# Patient Record
Sex: FEMALE | Race: WHITE | NOT HISPANIC OR LATINO | URBAN - METROPOLITAN AREA
[De-identification: names, ages, dates, MRNs, and addresses within clinical notes are randomized per-mention and may not be internally consistent; named-entity substitution may affect disease eponyms.]

---

## 2017-03-03 ENCOUNTER — OUTPATIENT (OUTPATIENT)
Dept: OUTPATIENT SERVICES | Facility: HOSPITAL | Age: 72
LOS: 1 days | End: 2017-03-03
Payer: MEDICARE

## 2017-03-03 ENCOUNTER — APPOINTMENT (OUTPATIENT)
Dept: CT IMAGING | Facility: IMAGING CENTER | Age: 72
End: 2017-03-03

## 2017-03-03 DIAGNOSIS — Z00.8 ENCOUNTER FOR OTHER GENERAL EXAMINATION: ICD-10-CM

## 2017-03-03 PROCEDURE — 74178 CT ABD&PLV WO CNTR FLWD CNTR: CPT

## 2017-03-03 PROCEDURE — 82565 ASSAY OF CREATININE: CPT

## 2018-05-02 ENCOUNTER — APPOINTMENT (OUTPATIENT)
Dept: ORTHOPEDIC SURGERY | Facility: CLINIC | Age: 73
End: 2018-05-02

## 2018-06-19 ENCOUNTER — APPOINTMENT (OUTPATIENT)
Dept: ORTHOPEDIC SURGERY | Facility: CLINIC | Age: 73
End: 2018-06-19
Payer: MEDICARE

## 2018-06-19 VITALS
HEART RATE: 64 BPM | WEIGHT: 154 LBS | HEIGHT: 60 IN | SYSTOLIC BLOOD PRESSURE: 131 MMHG | DIASTOLIC BLOOD PRESSURE: 78 MMHG | BODY MASS INDEX: 30.23 KG/M2

## 2018-06-19 DIAGNOSIS — M17.11 UNILATERAL PRIMARY OSTEOARTHRITIS, RIGHT KNEE: ICD-10-CM

## 2018-06-19 DIAGNOSIS — M25.561 PAIN IN RIGHT KNEE: ICD-10-CM

## 2018-06-19 DIAGNOSIS — Z87.891 PERSONAL HISTORY OF NICOTINE DEPENDENCE: ICD-10-CM

## 2018-06-19 PROCEDURE — 72170 X-RAY EXAM OF PELVIS: CPT | Mod: 59

## 2018-06-19 PROCEDURE — 99204 OFFICE O/P NEW MOD 45 MIN: CPT

## 2018-06-19 PROCEDURE — 73562 X-RAY EXAM OF KNEE 3: CPT | Mod: RT

## 2018-06-19 RX ORDER — MULTIVIT-MIN/FA/LYCOPEN/LUTEIN .4-300-25
TABLET ORAL
Refills: 0 | Status: ACTIVE | COMMUNITY

## 2018-06-19 RX ORDER — CALCIUM CARB/VITAMIN D3/VIT K1 650MG-12.5
TABLET,CHEWABLE ORAL
Refills: 0 | Status: ACTIVE | COMMUNITY

## 2018-06-26 ENCOUNTER — FORM ENCOUNTER (OUTPATIENT)
Age: 73
End: 2018-06-26

## 2018-06-27 ENCOUNTER — OUTPATIENT (OUTPATIENT)
Dept: OUTPATIENT SERVICES | Facility: HOSPITAL | Age: 73
LOS: 1 days | End: 2018-06-27
Payer: MEDICARE

## 2018-06-27 ENCOUNTER — APPOINTMENT (OUTPATIENT)
Dept: MRI IMAGING | Facility: CLINIC | Age: 73
End: 2018-06-27
Payer: COMMERCIAL

## 2018-06-27 DIAGNOSIS — Z00.8 ENCOUNTER FOR OTHER GENERAL EXAMINATION: ICD-10-CM

## 2018-06-27 PROCEDURE — 73721 MRI JNT OF LWR EXTRE W/O DYE: CPT

## 2018-06-27 PROCEDURE — 73721 MRI JNT OF LWR EXTRE W/O DYE: CPT | Mod: 26,RT

## 2018-07-13 ENCOUNTER — OUTPATIENT (OUTPATIENT)
Dept: OUTPATIENT SERVICES | Facility: HOSPITAL | Age: 73
LOS: 1 days | End: 2018-07-13
Payer: MEDICARE

## 2018-07-13 VITALS
RESPIRATION RATE: 16 BRPM | TEMPERATURE: 98 F | HEIGHT: 60 IN | WEIGHT: 154.98 LBS | SYSTOLIC BLOOD PRESSURE: 150 MMHG | OXYGEN SATURATION: 97 % | HEART RATE: 62 BPM | DIASTOLIC BLOOD PRESSURE: 69 MMHG

## 2018-07-13 DIAGNOSIS — Z98.890 OTHER SPECIFIED POSTPROCEDURAL STATES: Chronic | ICD-10-CM

## 2018-07-13 DIAGNOSIS — M17.11 UNILATERAL PRIMARY OSTEOARTHRITIS, RIGHT KNEE: ICD-10-CM

## 2018-07-13 DIAGNOSIS — M25.561 PAIN IN RIGHT KNEE: ICD-10-CM

## 2018-07-13 DIAGNOSIS — Z01.818 ENCOUNTER FOR OTHER PREPROCEDURAL EXAMINATION: ICD-10-CM

## 2018-07-13 LAB
ALBUMIN SERPL ELPH-MCNC: 3.9 G/DL — SIGNIFICANT CHANGE UP (ref 3.3–5)
ALP SERPL-CCNC: 85 U/L — SIGNIFICANT CHANGE UP (ref 30–120)
ALT FLD-CCNC: 23 U/L DA — SIGNIFICANT CHANGE UP (ref 10–60)
ANION GAP SERPL CALC-SCNC: 5 MMOL/L — SIGNIFICANT CHANGE UP (ref 5–17)
APTT BLD: 28.3 SEC — SIGNIFICANT CHANGE UP (ref 27.5–37.4)
AST SERPL-CCNC: 20 U/L — SIGNIFICANT CHANGE UP (ref 10–40)
BILIRUB SERPL-MCNC: 0.3 MG/DL — SIGNIFICANT CHANGE UP (ref 0.2–1.2)
BLD GP AB SCN SERPL QL: SIGNIFICANT CHANGE UP
BUN SERPL-MCNC: 14 MG/DL — SIGNIFICANT CHANGE UP (ref 7–23)
CALCIUM SERPL-MCNC: 9.7 MG/DL — SIGNIFICANT CHANGE UP (ref 8.4–10.5)
CHLORIDE SERPL-SCNC: 99 MMOL/L — SIGNIFICANT CHANGE UP (ref 96–108)
CO2 SERPL-SCNC: 33 MMOL/L — HIGH (ref 22–31)
CREAT SERPL-MCNC: 0.82 MG/DL — SIGNIFICANT CHANGE UP (ref 0.5–1.3)
GLUCOSE SERPL-MCNC: 96 MG/DL — SIGNIFICANT CHANGE UP (ref 70–99)
HCT VFR BLD CALC: 40.7 % — SIGNIFICANT CHANGE UP (ref 34.5–45)
HGB BLD-MCNC: 13.5 G/DL — SIGNIFICANT CHANGE UP (ref 11.5–15.5)
INR BLD: 0.98 RATIO — SIGNIFICANT CHANGE UP (ref 0.88–1.16)
MCHC RBC-ENTMCNC: 29.7 PG — SIGNIFICANT CHANGE UP (ref 27–34)
MCHC RBC-ENTMCNC: 33.2 GM/DL — SIGNIFICANT CHANGE UP (ref 32–36)
MCV RBC AUTO: 89.6 FL — SIGNIFICANT CHANGE UP (ref 80–100)
MRSA PCR RESULT.: SIGNIFICANT CHANGE UP
NRBC # BLD: 0 /100 WBCS — SIGNIFICANT CHANGE UP (ref 0–0)
PLATELET # BLD AUTO: 183 K/UL — SIGNIFICANT CHANGE UP (ref 150–400)
POTASSIUM SERPL-MCNC: 4.6 MMOL/L — SIGNIFICANT CHANGE UP (ref 3.5–5.3)
POTASSIUM SERPL-SCNC: 4.6 MMOL/L — SIGNIFICANT CHANGE UP (ref 3.5–5.3)
PROT SERPL-MCNC: 8.3 G/DL — SIGNIFICANT CHANGE UP (ref 6–8.3)
PROTHROM AB SERPL-ACNC: 10.7 SEC — SIGNIFICANT CHANGE UP (ref 9.8–12.7)
RBC # BLD: 4.54 M/UL — SIGNIFICANT CHANGE UP (ref 3.8–5.2)
RBC # FLD: 13 % — SIGNIFICANT CHANGE UP (ref 10.3–14.5)
S AUREUS DNA NOSE QL NAA+PROBE: DETECTED
SODIUM SERPL-SCNC: 137 MMOL/L — SIGNIFICANT CHANGE UP (ref 135–145)
WBC # BLD: 5.95 K/UL — SIGNIFICANT CHANGE UP (ref 3.8–10.5)
WBC # FLD AUTO: 5.95 K/UL — SIGNIFICANT CHANGE UP (ref 3.8–10.5)

## 2018-07-13 PROCEDURE — 85730 THROMBOPLASTIN TIME PARTIAL: CPT

## 2018-07-13 PROCEDURE — 87641 MR-STAPH DNA AMP PROBE: CPT

## 2018-07-13 PROCEDURE — 93010 ELECTROCARDIOGRAM REPORT: CPT | Mod: NC

## 2018-07-13 PROCEDURE — G0463: CPT

## 2018-07-13 PROCEDURE — 93005 ELECTROCARDIOGRAM TRACING: CPT

## 2018-07-13 PROCEDURE — 87640 STAPH A DNA AMP PROBE: CPT

## 2018-07-13 PROCEDURE — 85027 COMPLETE CBC AUTOMATED: CPT

## 2018-07-13 PROCEDURE — 86850 RBC ANTIBODY SCREEN: CPT

## 2018-07-13 PROCEDURE — 86901 BLOOD TYPING SEROLOGIC RH(D): CPT

## 2018-07-13 PROCEDURE — 86900 BLOOD TYPING SEROLOGIC ABO: CPT

## 2018-07-13 PROCEDURE — 85610 PROTHROMBIN TIME: CPT

## 2018-07-13 PROCEDURE — 80053 COMPREHEN METABOLIC PANEL: CPT

## 2018-07-13 NOTE — H&P PST ADULT - PSH
History of   x2 ,   S/P lumpectomy of breast  right breast   Status post rotator cuff repair  right,

## 2018-07-13 NOTE — H&P PST ADULT - DOES PATIENT HAVE ADVANCE DIRECTIVE
Alert and oriented to person, place and time, memory intact, behavior appropriate to situation, PERRL. No

## 2018-07-13 NOTE — H&P PST ADULT - NEGATIVE ENMT SYMPTOMS
no hearing difficulty/no ear pain/no nasal obstruction/no post-nasal discharge/no abnormal taste sensation/no gum bleeding/no throat pain/no dysphagia/no nasal discharge/no nose bleeds/no recurrent cold sores/no sinus symptoms/no nasal congestion/no tinnitus/no dry mouth/no vertigo

## 2018-07-13 NOTE — H&P PST ADULT - MUSCULOSKELETAL
details… detailed exam no joint swelling/no joint erythema/decreased ROM due to pain/no calf tenderness/no joint warmth

## 2018-07-13 NOTE — H&P PST ADULT - PROBLEM SELECTOR PLAN 1
"Right total knee replacement"on 7/25/18  Pre op instructions were reviewed and signed  Patient will obtain medical clearance

## 2018-07-13 NOTE — H&P PST ADULT - HISTORY OF PRESENT ILLNESS
71 yo female is scheduled for "Right total knee replacement" on 7/25/18 with Cory Cruz MD.   Patient reports longstanding right knee pain for which has tried cortisone injections without relief.  Pain worst with weight bearing and rates 7/10.

## 2018-07-13 NOTE — H&P PST ADULT - NEGATIVE ALLERGY TYPES
no outdoor environmental allergies/no reactions to medicines/no reactions to food/no indoor environmental allergies

## 2018-07-13 NOTE — H&P PST ADULT - FAMILY HISTORY
Father  Still living? No  Family history of stroke (cerebrovascular), Age at diagnosis: Age Unknown     Sibling  Still living? No  Family history of brain tumor, Age at diagnosis: Age Unknown

## 2018-07-13 NOTE — H&P PST ADULT - NSANTHOSAYNRD_GEN_A_CORE
No. HA screening performed.  STOP BANG Legend: 0-2 = LOW Risk; 3-4 = INTERMEDIATE Risk; 5-8 = HIGH Risk

## 2018-07-16 RX ORDER — MUPIROCIN 20 MG/G
1 OINTMENT TOPICAL
Qty: 1 | Refills: 0 | OUTPATIENT
Start: 2018-07-16 | End: 2018-07-20

## 2018-07-16 NOTE — PROGRESS NOTE ADULT - SUBJECTIVE AND OBJECTIVE BOX
Call placed to inform patient about + MSSA nasal culture. Instructions for use of ointment reviewed with patient. She is reminded to document use on the yellow worksheet and to bring it in on day of surgery. All questions addressed and pt expresses understanding of instructions. She is coming for joint replacement class tomorrow. tg

## 2018-07-17 RX ORDER — CHLORHEXIDINE GLUCONATE 213 G/1000ML
1 SOLUTION TOPICAL ONCE
Qty: 0 | Refills: 0 | Status: COMPLETED | OUTPATIENT
Start: 2018-07-25 | End: 2018-07-25

## 2018-07-17 RX ORDER — APREPITANT 80 MG/1
40 CAPSULE ORAL ONCE
Qty: 0 | Refills: 0 | Status: COMPLETED | OUTPATIENT
Start: 2018-07-25 | End: 2018-07-25

## 2018-07-17 RX ORDER — SODIUM CHLORIDE 9 MG/ML
1000 INJECTION, SOLUTION INTRAVENOUS
Qty: 0 | Refills: 0 | Status: DISCONTINUED | OUTPATIENT
Start: 2018-07-25 | End: 2018-07-25

## 2018-07-24 RX ORDER — CELECOXIB 200 MG/1
200 CAPSULE ORAL ONCE
Qty: 0 | Refills: 0 | Status: COMPLETED | OUTPATIENT
Start: 2018-07-25 | End: 2018-07-25

## 2018-07-24 RX ORDER — SODIUM CHLORIDE 9 MG/ML
1000 INJECTION, SOLUTION INTRAVENOUS
Qty: 0 | Refills: 0 | Status: DISCONTINUED | OUTPATIENT
Start: 2018-07-25 | End: 2018-07-27

## 2018-07-24 RX ORDER — PANTOPRAZOLE SODIUM 20 MG/1
40 TABLET, DELAYED RELEASE ORAL DAILY
Qty: 0 | Refills: 0 | Status: DISCONTINUED | OUTPATIENT
Start: 2018-07-25 | End: 2018-07-27

## 2018-07-24 RX ORDER — MAGNESIUM HYDROXIDE 400 MG/1
30 TABLET, CHEWABLE ORAL DAILY
Qty: 0 | Refills: 0 | Status: DISCONTINUED | OUTPATIENT
Start: 2018-07-25 | End: 2018-07-27

## 2018-07-24 RX ORDER — DOCUSATE SODIUM 100 MG
100 CAPSULE ORAL THREE TIMES A DAY
Qty: 0 | Refills: 0 | Status: DISCONTINUED | OUTPATIENT
Start: 2018-07-25 | End: 2018-07-27

## 2018-07-24 RX ORDER — ONDANSETRON 8 MG/1
4 TABLET, FILM COATED ORAL EVERY 6 HOURS
Qty: 0 | Refills: 0 | Status: DISCONTINUED | OUTPATIENT
Start: 2018-07-25 | End: 2018-07-27

## 2018-07-24 RX ORDER — POLYETHYLENE GLYCOL 3350 17 G/17G
17 POWDER, FOR SOLUTION ORAL DAILY
Qty: 0 | Refills: 0 | Status: DISCONTINUED | OUTPATIENT
Start: 2018-07-25 | End: 2018-07-27

## 2018-07-24 RX ORDER — SENNA PLUS 8.6 MG/1
2 TABLET ORAL AT BEDTIME
Qty: 0 | Refills: 0 | Status: DISCONTINUED | OUTPATIENT
Start: 2018-07-25 | End: 2018-07-27

## 2018-07-25 ENCOUNTER — RESULT REVIEW (OUTPATIENT)
Age: 73
End: 2018-07-25

## 2018-07-25 ENCOUNTER — APPOINTMENT (OUTPATIENT)
Dept: ORTHOPEDIC SURGERY | Facility: HOSPITAL | Age: 73
End: 2018-07-25

## 2018-07-25 ENCOUNTER — INPATIENT (INPATIENT)
Facility: HOSPITAL | Age: 73
LOS: 1 days | Discharge: NURSING, REHAB OR LONG-TERM CARE | DRG: 470 | End: 2018-07-27
Attending: ORTHOPAEDIC SURGERY | Admitting: ORTHOPAEDIC SURGERY
Payer: MEDICARE

## 2018-07-25 VITALS
OXYGEN SATURATION: 100 % | RESPIRATION RATE: 16 BRPM | HEIGHT: 61 IN | HEART RATE: 64 BPM | WEIGHT: 154.98 LBS | DIASTOLIC BLOOD PRESSURE: 65 MMHG | TEMPERATURE: 98 F | SYSTOLIC BLOOD PRESSURE: 134 MMHG

## 2018-07-25 DIAGNOSIS — M17.11 UNILATERAL PRIMARY OSTEOARTHRITIS, RIGHT KNEE: ICD-10-CM

## 2018-07-25 DIAGNOSIS — Z98.890 OTHER SPECIFIED POSTPROCEDURAL STATES: Chronic | ICD-10-CM

## 2018-07-25 DIAGNOSIS — M25.561 PAIN IN RIGHT KNEE: ICD-10-CM

## 2018-07-25 DIAGNOSIS — Z01.818 ENCOUNTER FOR OTHER PREPROCEDURAL EXAMINATION: ICD-10-CM

## 2018-07-25 LAB
ABO RH CONFIRMATION: SIGNIFICANT CHANGE UP
ANION GAP SERPL CALC-SCNC: 2 MMOL/L — LOW (ref 5–17)
BUN SERPL-MCNC: 11 MG/DL — SIGNIFICANT CHANGE UP (ref 7–23)
CALCIUM SERPL-MCNC: 8.8 MG/DL — SIGNIFICANT CHANGE UP (ref 8.4–10.5)
CHLORIDE SERPL-SCNC: 103 MMOL/L — SIGNIFICANT CHANGE UP (ref 96–108)
CO2 SERPL-SCNC: 30 MMOL/L — SIGNIFICANT CHANGE UP (ref 22–31)
CREAT SERPL-MCNC: 0.93 MG/DL — SIGNIFICANT CHANGE UP (ref 0.5–1.3)
GLUCOSE SERPL-MCNC: 257 MG/DL — HIGH (ref 70–99)
HCT VFR BLD CALC: 37.4 % — SIGNIFICANT CHANGE UP (ref 34.5–45)
HGB BLD-MCNC: 12.4 G/DL — SIGNIFICANT CHANGE UP (ref 11.5–15.5)
MCHC RBC-ENTMCNC: 29.9 PG — SIGNIFICANT CHANGE UP (ref 27–34)
MCHC RBC-ENTMCNC: 33.2 GM/DL — SIGNIFICANT CHANGE UP (ref 32–36)
MCV RBC AUTO: 90.1 FL — SIGNIFICANT CHANGE UP (ref 80–100)
NRBC # BLD: 0 /100 WBCS — SIGNIFICANT CHANGE UP (ref 0–0)
PLATELET # BLD AUTO: 143 K/UL — LOW (ref 150–400)
POTASSIUM SERPL-MCNC: 4.2 MMOL/L — SIGNIFICANT CHANGE UP (ref 3.5–5.3)
POTASSIUM SERPL-SCNC: 4.2 MMOL/L — SIGNIFICANT CHANGE UP (ref 3.5–5.3)
RBC # BLD: 4.15 M/UL — SIGNIFICANT CHANGE UP (ref 3.8–5.2)
RBC # FLD: 13.2 % — SIGNIFICANT CHANGE UP (ref 10.3–14.5)
SODIUM SERPL-SCNC: 135 MMOL/L — SIGNIFICANT CHANGE UP (ref 135–145)
WBC # BLD: 8.63 K/UL — SIGNIFICANT CHANGE UP (ref 3.8–10.5)
WBC # FLD AUTO: 8.63 K/UL — SIGNIFICANT CHANGE UP (ref 3.8–10.5)

## 2018-07-25 PROCEDURE — 88305 TISSUE EXAM BY PATHOLOGIST: CPT | Mod: 26

## 2018-07-25 PROCEDURE — 27447 TOTAL KNEE ARTHROPLASTY: CPT | Mod: RT

## 2018-07-25 PROCEDURE — 88311 DECALCIFY TISSUE: CPT | Mod: 26

## 2018-07-25 PROCEDURE — 73562 X-RAY EXAM OF KNEE 3: CPT | Mod: 26,RT

## 2018-07-25 RX ORDER — HYDROMORPHONE HYDROCHLORIDE 2 MG/ML
0.5 INJECTION INTRAMUSCULAR; INTRAVENOUS; SUBCUTANEOUS
Qty: 0 | Refills: 0 | Status: DISCONTINUED | OUTPATIENT
Start: 2018-07-25 | End: 2018-07-27

## 2018-07-25 RX ORDER — APIXABAN 2.5 MG/1
2.5 TABLET, FILM COATED ORAL
Qty: 0 | Refills: 0 | Status: COMPLETED | OUTPATIENT
Start: 2018-07-26 | End: 2018-07-26

## 2018-07-25 RX ORDER — ONDANSETRON 8 MG/1
4 TABLET, FILM COATED ORAL ONCE
Qty: 0 | Refills: 0 | Status: DISCONTINUED | OUTPATIENT
Start: 2018-07-25 | End: 2018-07-25

## 2018-07-25 RX ORDER — ACETAMINOPHEN 500 MG
1000 TABLET ORAL ONCE
Qty: 0 | Refills: 0 | Status: COMPLETED | OUTPATIENT
Start: 2018-07-25 | End: 2018-07-25

## 2018-07-25 RX ORDER — HYDROMORPHONE HYDROCHLORIDE 2 MG/ML
0.5 INJECTION INTRAMUSCULAR; INTRAVENOUS; SUBCUTANEOUS
Qty: 0 | Refills: 0 | Status: DISCONTINUED | OUTPATIENT
Start: 2018-07-25 | End: 2018-07-25

## 2018-07-25 RX ORDER — OXYCODONE HYDROCHLORIDE 5 MG/1
5 TABLET ORAL
Qty: 0 | Refills: 0 | Status: DISCONTINUED | OUTPATIENT
Start: 2018-07-25 | End: 2018-07-27

## 2018-07-25 RX ORDER — CELECOXIB 200 MG/1
200 CAPSULE ORAL EVERY 12 HOURS
Qty: 0 | Refills: 0 | Status: DISCONTINUED | OUTPATIENT
Start: 2018-07-25 | End: 2018-07-27

## 2018-07-25 RX ORDER — TRANEXAMIC ACID 100 MG/ML
1000 INJECTION, SOLUTION INTRAVENOUS ONCE
Qty: 0 | Refills: 0 | Status: COMPLETED | OUTPATIENT
Start: 2018-07-25 | End: 2018-07-25

## 2018-07-25 RX ORDER — CEFAZOLIN SODIUM 1 G
2000 VIAL (EA) INJECTION EVERY 8 HOURS
Qty: 0 | Refills: 0 | Status: COMPLETED | OUTPATIENT
Start: 2018-07-25 | End: 2018-07-26

## 2018-07-25 RX ORDER — ACETAMINOPHEN 500 MG
1000 TABLET ORAL EVERY 8 HOURS
Qty: 0 | Refills: 0 | Status: DISCONTINUED | OUTPATIENT
Start: 2018-07-26 | End: 2018-07-27

## 2018-07-25 RX ORDER — OXYCODONE HYDROCHLORIDE 5 MG/1
10 TABLET ORAL
Qty: 0 | Refills: 0 | Status: DISCONTINUED | OUTPATIENT
Start: 2018-07-25 | End: 2018-07-27

## 2018-07-25 RX ORDER — APIXABAN 2.5 MG/1
2.5 TABLET, FILM COATED ORAL EVERY 12 HOURS
Qty: 0 | Refills: 0 | Status: DISCONTINUED | OUTPATIENT
Start: 2018-07-27 | End: 2018-07-27

## 2018-07-25 RX ORDER — CEFAZOLIN SODIUM 1 G
2000 VIAL (EA) INJECTION ONCE
Qty: 0 | Refills: 0 | Status: DISCONTINUED | OUTPATIENT
Start: 2018-07-25 | End: 2018-07-25

## 2018-07-25 RX ORDER — ACETAMINOPHEN 500 MG
1000 TABLET ORAL EVERY 6 HOURS
Qty: 0 | Refills: 0 | Status: COMPLETED | OUTPATIENT
Start: 2018-07-25 | End: 2018-07-26

## 2018-07-25 RX ORDER — SODIUM CHLORIDE 9 MG/ML
1000 INJECTION, SOLUTION INTRAVENOUS
Qty: 0 | Refills: 0 | Status: DISCONTINUED | OUTPATIENT
Start: 2018-07-25 | End: 2018-07-25

## 2018-07-25 RX ADMIN — CHLORHEXIDINE GLUCONATE 1 APPLICATION(S): 213 SOLUTION TOPICAL at 10:44

## 2018-07-25 RX ADMIN — SODIUM CHLORIDE 75 MILLILITER(S): 9 INJECTION, SOLUTION INTRAVENOUS at 16:15

## 2018-07-25 RX ADMIN — OXYCODONE HYDROCHLORIDE 5 MILLIGRAM(S): 5 TABLET ORAL at 23:40

## 2018-07-25 RX ADMIN — Medication 400 MILLIGRAM(S): at 19:52

## 2018-07-25 RX ADMIN — Medication 1000 MILLIGRAM(S): at 20:20

## 2018-07-25 RX ADMIN — CELECOXIB 200 MILLIGRAM(S): 200 CAPSULE ORAL at 10:43

## 2018-07-25 RX ADMIN — Medication 100 MILLIGRAM(S): at 20:20

## 2018-07-25 RX ADMIN — APREPITANT 40 MILLIGRAM(S): 80 CAPSULE ORAL at 10:44

## 2018-07-25 RX ADMIN — SODIUM CHLORIDE 75 MILLILITER(S): 9 INJECTION, SOLUTION INTRAVENOUS at 10:44

## 2018-07-25 NOTE — PROGRESS NOTE ADULT - SUBJECTIVE AND OBJECTIVE BOX
ORTHOPEDIC PA PROGRESS NOTE  ARNAUD NASH      72y Female                                                                                                                               POD #0        STATUS POST:               Pre-Op Dx: Primary osteoarthritis of right knee    Post-Op Dx:  Primary osteoarthritis of right knee    Procedure: Total knee arthroplasty: right total knee                                              Patient comfortable pain controlled. No compliants    Current Pain Management:  [ ] PCA   [x ] Po Analgesics [x ] IM /IV Anagesics     T(F): 97.6  HR: 62  BP: 119/55  RR: 17  SpO2: 96%                          Physical Exam :    -   Dressing clean dry intact  -   Distal Neurvascular status intact grossly.   -   Warm well perfused; capillary refill <3 seconds   -   Dorsi/plantar flexion 0/5 secondary to spinal anesthesia  -   (-) Calf tenderness Bilaterally    A/P: 72y Female s/p Primary osteoarthritis of right knee    -   Ortho Stable  -   Pain control   -   Medicine to follow  -   DVT ppx:     [x ]SCDs     [ ] ASA     [x ] Eliquis     [ ] Lovenox  -   Weight bearing status:  WBAT [x ]        PWB    [ ]     TTWB  [ ]      NWB  [ ]   -  Dispo:     Home [ ]     Acute Rehab [ ]     [facility] [ ]     TBD [x ]  -  No dorsi/plantar flexion prob from spinal will monitor

## 2018-07-25 NOTE — CONSULT NOTE ADULT - ASSESSMENT
72 female    1. primary severe osteoarthritis of joint s/p right tkr: opiates prn + bowel regimen.   Physical Therapy evaluation.  Discussed with orthopedic service Physician Assistant.     2. h/o breast cancer 2001 - no active issues.    3. post-op nausea and epigastric discomfort - zofran prn. PPI daily.    4. dvt prophylaxis per orthopedic protocol     5. Dispo: home in 2 days.  d/w RN plan of care.

## 2018-07-25 NOTE — CONSULT NOTE ADULT - SUBJECTIVE AND OBJECTIVE BOX
Patient is a 72y old  Female who presents with a chief complaint of "Dr Cruz is going to replace my RIGHT knee" (2018 15:58)        HPI: primary severe osteoarthritis of joint s/p right tkr.    feels well; no pain.    h/o breast cancer  - no active issues    post-op nausea and epigastric discomfort - medicated      PAST MEDICAL & SURGICAL HISTORY:  BMI 30.0-30.9,adult  History of breast cancer:   Osteoarthritis of right knee  Status post rotator cuff repair: right, 2012  History of : x2 ,   S/P lumpectomy of breast: right breast       REVIEW OF SYSTEMS:    negative unless otherwise specified in HPI.      MEDICATIONS  (STANDING):  acetaminophen  IVPB. 1000 milliGRAM(s) IV Intermittent every 6 hours  ceFAZolin   IVPB 2000 milliGRAM(s) IV Intermittent once  ceFAZolin   IVPB 2000 milliGRAM(s) IV Intermittent every 8 hours  celecoxib 200 milliGRAM(s) Oral every 12 hours  lactated ringers. 1000 milliLiter(s) (75 mL/Hr) IV Continuous <Continuous>    MEDICATIONS  (PRN):  HYDROmorphone  Injectable 0.5 milliGRAM(s) IV Push every 3 hours PRN Severe Pain (7 - 10)  HYDROmorphone  Injectable 0.5 milliGRAM(s) IV Push every 10 minutes PRN Severe Pain (7 - 10)  ondansetron Injectable 4 milliGRAM(s) IV Push once PRN Nausea and/or Vomiting  oxyCODONE    IR 5 milliGRAM(s) Oral every 3 hours PRN Mild Pain (1 - 3)  oxyCODONE    IR 10 milliGRAM(s) Oral every 3 hours PRN Moderate Pain (4 - 6)      Allergies    No Known Allergies    Intolerances        SOCIAL HISTORY: ex-smoker    FAMILY HISTORY:  Family history of brain tumor (Sibling)  Family history of stroke (cerebrovascular) (Father)      Vital Signs Last 24 Hrs  T(C): 36.4 (2018 14:27), Max: 36.8 (2018 12:32)  T(F): 97.6 (2018 14:27), Max: 98.3 (2018 12:32)  HR: 54 (2018 14:52) (54 - 70)  BP: 103/47 (2018 14:52) (95/70 - 134/65)  BP(mean): --  RR: 17 (2018 14:52) (15 - 18)  SpO2: 100% (2018 14:52) (100% - 100%)    PHYSICAL EXAM:  GENERAL: No apparent distress  HEAD:  Atraumatic, Normocephalic  EYES: conjunctiva and sclera clear  ENMT: Moist mucous membranes  NECK: Supple  CHEST/LUNG: Clear to auscultation; no rales/wheeze or rubs  HEART: Regular rate and rhythm, no murmurs, rubs or gallops  ABDOMEN: Soft, Nontender, Nondistended; Bowel sounds present  EXTREMITIES:  No clubbing, cyanosis or edema  SKIN: No rashes or lesions  NERVOUS SYSTEM:  Alert & Oriented X3; Bilateral lower extremity mobile, sensation to light touch intact  INCISION:  Dressing dry and intact    LABS:                                              IMAGING: imaging reviewed personally    Consultant Notes Reviewed and Care Discussed with relevant Consultants/Other Providers.

## 2018-07-25 NOTE — BRIEF OPERATIVE NOTE - PROCEDURE
<<-----Click on this checkbox to enter Procedure Total knee arthroplasty  07/25/2018  right total knee  Active  MATTEO

## 2018-07-26 ENCOUNTER — TRANSCRIPTION ENCOUNTER (OUTPATIENT)
Age: 73
End: 2018-07-26

## 2018-07-26 LAB
ANION GAP SERPL CALC-SCNC: 5 MMOL/L — SIGNIFICANT CHANGE UP (ref 5–17)
BUN SERPL-MCNC: 11 MG/DL — SIGNIFICANT CHANGE UP (ref 7–23)
CALCIUM SERPL-MCNC: 9.1 MG/DL — SIGNIFICANT CHANGE UP (ref 8.4–10.5)
CHLORIDE SERPL-SCNC: 105 MMOL/L — SIGNIFICANT CHANGE UP (ref 96–108)
CO2 SERPL-SCNC: 29 MMOL/L — SIGNIFICANT CHANGE UP (ref 22–31)
CREAT SERPL-MCNC: 0.78 MG/DL — SIGNIFICANT CHANGE UP (ref 0.5–1.3)
GLUCOSE SERPL-MCNC: 135 MG/DL — HIGH (ref 70–99)
HCT VFR BLD CALC: 34.1 % — LOW (ref 34.5–45)
HGB BLD-MCNC: 11.6 G/DL — SIGNIFICANT CHANGE UP (ref 11.5–15.5)
MCHC RBC-ENTMCNC: 30.4 PG — SIGNIFICANT CHANGE UP (ref 27–34)
MCHC RBC-ENTMCNC: 34 GM/DL — SIGNIFICANT CHANGE UP (ref 32–36)
MCV RBC AUTO: 89.5 FL — SIGNIFICANT CHANGE UP (ref 80–100)
NRBC # BLD: 0 /100 WBCS — SIGNIFICANT CHANGE UP (ref 0–0)
PLATELET # BLD AUTO: 158 K/UL — SIGNIFICANT CHANGE UP (ref 150–400)
POTASSIUM SERPL-MCNC: 4.8 MMOL/L — SIGNIFICANT CHANGE UP (ref 3.5–5.3)
POTASSIUM SERPL-SCNC: 4.8 MMOL/L — SIGNIFICANT CHANGE UP (ref 3.5–5.3)
RBC # BLD: 3.81 M/UL — SIGNIFICANT CHANGE UP (ref 3.8–5.2)
RBC # FLD: 13.1 % — SIGNIFICANT CHANGE UP (ref 10.3–14.5)
SODIUM SERPL-SCNC: 139 MMOL/L — SIGNIFICANT CHANGE UP (ref 135–145)
WBC # BLD: 12.43 K/UL — HIGH (ref 3.8–10.5)
WBC # FLD AUTO: 12.43 K/UL — HIGH (ref 3.8–10.5)

## 2018-07-26 PROCEDURE — 99233 SBSQ HOSP IP/OBS HIGH 50: CPT

## 2018-07-26 RX ORDER — APIXABAN 2.5 MG/1
1 TABLET, FILM COATED ORAL
Qty: 20 | Refills: 0
Start: 2018-07-26 | End: 2018-08-04

## 2018-07-26 RX ORDER — PANTOPRAZOLE SODIUM 20 MG/1
1 TABLET, DELAYED RELEASE ORAL
Qty: 30 | Refills: 1
Start: 2018-07-26 | End: 2018-09-23

## 2018-07-26 RX ORDER — CELECOXIB 200 MG/1
1 CAPSULE ORAL
Qty: 38 | Refills: 0 | OUTPATIENT
Start: 2018-07-26 | End: 2018-08-13

## 2018-07-26 RX ADMIN — CELECOXIB 200 MILLIGRAM(S): 200 CAPSULE ORAL at 17:44

## 2018-07-26 RX ADMIN — Medication 100 MILLIGRAM(S): at 21:33

## 2018-07-26 RX ADMIN — Medication 400 MILLIGRAM(S): at 00:56

## 2018-07-26 RX ADMIN — CELECOXIB 200 MILLIGRAM(S): 200 CAPSULE ORAL at 06:01

## 2018-07-26 RX ADMIN — PANTOPRAZOLE SODIUM 40 MILLIGRAM(S): 20 TABLET, DELAYED RELEASE ORAL at 12:29

## 2018-07-26 RX ADMIN — Medication 1000 MILLIGRAM(S): at 21:33

## 2018-07-26 RX ADMIN — APIXABAN 2.5 MILLIGRAM(S): 2.5 TABLET, FILM COATED ORAL at 12:28

## 2018-07-26 RX ADMIN — Medication 1000 MILLIGRAM(S): at 01:10

## 2018-07-26 RX ADMIN — APIXABAN 2.5 MILLIGRAM(S): 2.5 TABLET, FILM COATED ORAL at 22:45

## 2018-07-26 RX ADMIN — Medication 1000 MILLIGRAM(S): at 07:04

## 2018-07-26 RX ADMIN — Medication 100 MILLIGRAM(S): at 12:30

## 2018-07-26 RX ADMIN — Medication 1000 MILLIGRAM(S): at 22:27

## 2018-07-26 RX ADMIN — Medication 400 MILLIGRAM(S): at 06:42

## 2018-07-26 RX ADMIN — CELECOXIB 200 MILLIGRAM(S): 200 CAPSULE ORAL at 18:14

## 2018-07-26 RX ADMIN — Medication 1000 MILLIGRAM(S): at 12:29

## 2018-07-26 RX ADMIN — Medication 100 MILLIGRAM(S): at 04:16

## 2018-07-26 RX ADMIN — CELECOXIB 200 MILLIGRAM(S): 200 CAPSULE ORAL at 06:19

## 2018-07-26 RX ADMIN — OXYCODONE HYDROCHLORIDE 5 MILLIGRAM(S): 5 TABLET ORAL at 00:15

## 2018-07-26 RX ADMIN — Medication 1000 MILLIGRAM(S): at 12:59

## 2018-07-26 RX ADMIN — SENNA PLUS 2 TABLET(S): 8.6 TABLET ORAL at 21:33

## 2018-07-26 NOTE — DISCHARGE NOTE ADULT - HOSPITAL COURSE
This patient was admitted to [facility] with a history of severe degenerative joint disease of the right knee.  Patient went to Pre-Surgical Testing at [facility] and was medically cleared to undergo elective procedure. Right TKR performed on 7/25/18 by . No operative or landry-operative complications arose during patients hospital course.  Patient received antibiotic according to SCIP guidelines for infection prevention.  Eliquis was given for DVT prophylaxis.  Anesthesia, Medical Hospitalist, Physical Therapy and Occupational Therapy were consulted. Patient is stable for discharge with a good prognosis.  Appropriate discharge instructions and medications are provided in this document. This patient was admitted to [facility] with a history of severe degenerative joint disease of the right knee.  Patient went to Pre-Surgical Testing at [facility] and was medically cleared to undergo elective procedure. Right TKR performed on 7/25/18 by . No operative or landry-operative complications arose during patients hospital course.  Patient received antibiotic according to SCIP guidelines for infection prevention.  Eliquis was given for DVT prophylaxis.  Anesthesia, Medical Hospitalist, Physical Therapy and Occupational Therapy were consulted. Patient is stable for discharge with a good prognosis.  Appropriate discharge instructions and medications are provided in this document.     Pt seen and examined. Pt noted to have platelets drop to 138 while on Eliquis. Pt to have repeat cbc on monday 07/30/18.  [phone] Dr Pedro Porter to follow results. Pt verbalized understanding.

## 2018-07-26 NOTE — DISCHARGE NOTE ADULT - CARE PLAN
Principal Discharge DX:	Osteoarthritis of right knee, unspecified osteoarthritis type  Goal:	Improve ambulation, ADLs and quality of life  Assessment and plan of treatment:	Physical Therapy/Occupational Therapy for ambulation, transfers, stairs, ADL's, Range of Motion Exercises, Isometrics.  Full weight bearing as tolerated with rolling walker  Range of Motion Goals: Flexion 120 degrees; Extension 0 degrees  Keep incision clean and dry.  Suture/prineo dressing removal 14 days after surgery at rehab facility or Surgeon's office  May shower post-op day #5 if no drainage from incision Principal Discharge DX:	Osteoarthritis of right knee, unspecified osteoarthritis type  Goal:	Improve ambulation, ADLs and quality of life  Assessment and plan of treatment:	Physical Therapy/Occupational Therapy for ambulation, transfers, stairs, ADL's, Range of Motion Exercises, Isometrics.  Full weight bearing as tolerated with rolling walker  Range of Motion Goals: Flexion 120 degrees; Extension 0 degrees  Keep incision clean and dry.  Suture/prineo dressing removal 14 days after surgery at rehab facility or Surgeon's office  May shower post-op day #5 if no drainage from incision  Goal:	DVT prophylaxis  Assessment and plan of treatment:	once Eliquis complete take Ecotrin 162mg twice a day x 4 weeks  Assessment and plan of treatment:	Pt noted to have platelets drop to 138 while on Eliquis. Pt to have repeat cbc on monday 07/30/18.  [phone] Dr Pedro Porter to follow results. Pt verbalized understanding.

## 2018-07-26 NOTE — DISCHARGE NOTE ADULT - INSTRUCTIONS
For Constipation :   • Increase your water intake. Drink at least 8 glasses of water daily.  • Try adding fiber to your diet by eating fruits, vegetables and foods that are rich in grains.  • If you do experience constipation, you may take an over-the-counter stool softener/laxative such as Neyda Colace, Senekot or  Milk of Magnesia. follow up with Dr. Cruz

## 2018-07-26 NOTE — PROGRESS NOTE ADULT - SUBJECTIVE AND OBJECTIVE BOX
chief complaint of "Dr Cruz is going to replace my RIGHT knee" (2018 15:58)    HPI:  71 yo female with pmh of breast cancer sp right lumpectpomy, OA sp elective right  knee replacement on 18.       REVIEW OF SYSTEMS  General: no lethargy	  Skin/Breast: no rash  Ophthalmologic: no blurry vision  ENMT:	no sore throat, no ear pain  Respiratory and Thorax:	no sob, no cough, wheezing  Cardiovascular:	no chest pain, no palpitations, no lower extremity swelling  Gastrointestinal:	no nausea, no vomiting,   Genitourinary: no dysuria, no frequency	  Musculoskeletal:	 no muscle pain  Neurological: no weakness  Psychiatric: no anxiety	  Hematology/Lymphatics:	 no bruising  Endocrine: no increased thirst, no change in appetite  PAST MEDICAL & SURGICAL HISTORY:  BMI 30.0-30.9,adult  History of breast cancer:   Osteoarthritis of right knee  Status post rotator cuff repair: right, 2012  History of : x2 1972,   S/P lumpectomy of breast: right breast     MEDICATIONS  (STANDING):  acetaminophen   Tablet. 1000 milliGRAM(s) Oral every 8 hours  apixaban 2.5 milliGRAM(s) Oral <User Schedule>  celecoxib 200 milliGRAM(s) Oral every 12 hours  docusate sodium 100 milliGRAM(s) Oral three times a day  lactated ringers. 1000 milliLiter(s) (100 mL/Hr) IV Continuous <Continuous>  pantoprazole    Tablet 40 milliGRAM(s) Oral daily  senna 2 Tablet(s) Oral at bedtime    MEDICATIONS  (PRN):  aluminum hydroxide/magnesium hydroxide/simethicone Suspension 30 milliLiter(s) Oral four times a day PRN Indigestion  HYDROmorphone  Injectable 0.5 milliGRAM(s) IV Push every 3 hours PRN Severe Pain (7 - 10)  magnesium hydroxide Suspension 30 milliLiter(s) Oral daily PRN Constipation  ondansetron Injectable 4 milliGRAM(s) IV Push every 6 hours PRN Nausea and/or Vomiting  oxyCODONE    IR 5 milliGRAM(s) Oral every 3 hours PRN Mild Pain (1 - 3)  oxyCODONE    IR 10 milliGRAM(s) Oral every 3 hours PRN Moderate Pain (4 - 6)  polyethylene glycol 3350 17 Gram(s) Oral daily PRN Constipation      EXAM:  Vital Signs Last 24 Hrs  T(C): 36.7 (2018 07:11), Max: 36.8 (2018 12:32)  T(F): 98 (2018 07:11), Max: 98.3 (2018 12:32)  HR: 61 (2018 07:11) (50 - 70)  BP: 104/60 (2018 07:11) (95/70 - 134/65)  BP(mean): --  RR: 15 (2018 07:11) (13 - 18)  SpO2: 95% (2018 07:11) (95% - 100%)     @ 07:01  -   @ 07:00  --------------------------------------------------------  IN: 4060 mL / OUT: 1250 mL / NET: 2810 mL        PHYSICAL EXAM:  Constitutional: awake sleeping in stretcher chair.   Eyes: eomi, perrla.  ENMT: patent nares, moist mucus membranes  Neck: supple  Breasts: deferred  Back: non tender. no scoliosis.   Respiratory: bilaterally clear to auscultation, no wheezing, no rhonchi, no crackles, no decreased air entry  Cardiovascular: s1s2, rrr, no murmurs.   Gastrointestinal: soft, non tender, +bowel sounds, no rebound, no guarding.   Genitourinary: deferred  Rectal: deferred   Extremities: no edema.   Vascular:   Neurological: alert and oriented to person, date and place.   Skin: intact no rash, warm to touch.   Lymph Nodes:  Musculoskeletal: moves all 4 extremities  Psychiatric: no homicidal, suicidal ideation. appropriate affect.     LABS:                              11.6   12.43 )-----------( 158      ( 2018 07:20 )             34.1         139  |  105  |  11  ----------------------------<  135<H>  4.8   |  29  |  0.78    Ca    9.1      2018 07:20 chief complaint of "Dr Cruz is going to replace my RIGHT knee" (2018 15:58)    HPI:  73 yo female with pmh of breast cancer sp right lumpectpomy, OA sp elective right  knee replacement on 18.     Today:  Pt notes being able to tolerate PT. Pt voiding freely, no bowel movement yet but is passing gas.     REVIEW OF SYSTEMS  General: no lethargy	  Skin/Breast: no rash  Ophthalmologic: no blurry vision  ENMT:	no sore throat, no ear pain  Respiratory and Thorax:	no sob, no cough, wheezing  Cardiovascular:	no chest pain, no palpitations, +ve lower extremity swelling  Gastrointestinal:	no nausea, no vomiting,   Genitourinary: no dysuria, no frequency	  Musculoskeletal:	 no muscle pain  Neurological: no weakness  Psychiatric: no anxiety	  Hematology/Lymphatics:	 no bruising  Endocrine: no increased thirst, no change in appetite    PAST MEDICAL & SURGICAL HISTORY:  BMI 30.0-30.9,adult  History of breast cancer:   Osteoarthritis of right knee  Status post rotator cuff repair: right, 2012  History of : x2 ,   S/P lumpectomy of breast: right breast     MEDICATIONS  (STANDING):  acetaminophen   Tablet. 1000 milliGRAM(s) Oral every 8 hours  apixaban 2.5 milliGRAM(s) Oral <User Schedule>  celecoxib 200 milliGRAM(s) Oral every 12 hours  docusate sodium 100 milliGRAM(s) Oral three times a day  lactated ringers. 1000 milliLiter(s) (100 mL/Hr) IV Continuous <Continuous>  pantoprazole    Tablet 40 milliGRAM(s) Oral daily  senna 2 Tablet(s) Oral at bedtime    MEDICATIONS  (PRN):  aluminum hydroxide/magnesium hydroxide/simethicone Suspension 30 milliLiter(s) Oral four times a day PRN Indigestion  HYDROmorphone  Injectable 0.5 milliGRAM(s) IV Push every 3 hours PRN Severe Pain (7 - 10)  magnesium hydroxide Suspension 30 milliLiter(s) Oral daily PRN Constipation  ondansetron Injectable 4 milliGRAM(s) IV Push every 6 hours PRN Nausea and/or Vomiting  oxyCODONE    IR 5 milliGRAM(s) Oral every 3 hours PRN Mild Pain (1 - 3)  oxyCODONE    IR 10 milliGRAM(s) Oral every 3 hours PRN Moderate Pain (4 - 6)  polyethylene glycol 3350 17 Gram(s) Oral daily PRN Constipation      EXAM:  Vital Signs Last 24 Hrs  T(C): 36.7 (2018 07:11), Max: 36.8 (2018 12:32)  T(F): 98 (2018 07:11), Max: 98.3 (2018 12:32)  HR: 61 (2018 07:11) (50 - 70)  BP: 104/60 (2018 07:11) (95/70 - 134/65)  BP(mean): --  RR: 15 (2018 07:11) (13 - 18)  SpO2: 95% (2018 07:11) (95% - 100%)     @ 07:01  -   @ 07:00  --------------------------------------------------------  IN: 4060 mL / OUT: 1250 mL / NET: 2810 mL    PHYSICAL EXAM:  Constitutional: awake in bed.  at bedside.   ENMT: patent nares, moist mucus membranes  Neck: supple  Respiratory: bilaterally clear to auscultation, no wheezing, no rhonchi, no crackles, no decreased air entry  Cardiovascular: s1s2, rrr, no murmurs.   Gastrointestinal: soft, non tender, +bowel sounds, no rebound, no guarding.   Extremities: +ve edema to RLE with ACE dressing.   Neurological: alert and oriented to person, date and place.   Skin: no rash, warm to touch.   Musculoskeletal: moves all 4 extremities  Psychiatric: appropriate affect.     LABS:                              11.6   12.43 )-----------( 158      ( 2018 07:20 )             34.1     -    139  |  105  |  11  ----------------------------<  135<H>  4.8   |  29  |  0.78    Ca    9.1      2018 07:20

## 2018-07-26 NOTE — DISCHARGE NOTE ADULT - PATIENT PORTAL LINK FT
You can access the [facility], offered by [facility], by registering with the following website: [email or web address]

## 2018-07-26 NOTE — DISCHARGE NOTE ADULT - CARE PROVIDERS DIRECT ADDRESSES
[email or web address]

## 2018-07-26 NOTE — OCCUPATIONAL THERAPY INITIAL EVALUATION ADULT - LEVEL OF INDEPENDENCE: SIT/STAND, REHAB EVAL
Patient has neurogenic bladder with suprapubic catheter who presented in sepsis secondary to UTI.    - S/P replacement of suprapubic catheter by urology, to f/u in 1 month.   - Urine cultures growing VRE   - Continue Ampicillin D6, (D9/10 total antibiotics) minimum assist (75% patients effort)

## 2018-07-26 NOTE — PROGRESS NOTE ADULT - ASSESSMENT
72 female admitted for elective right  knee replacement of O.A:    -Primary severe osteoarthritis of joint s/p right tkr:   Pain control with opiates prn  Continue with bowel regimen.   Physical Therapy and OT.      - h/o breast cancer 2001:  no active issues.    - post-op nausea and epigastric discomfort:  zofran prn. PPI daily.    -Elevated fasting glucose x1:  check Hga1c and tomorrow's fasting glucose in am.     -DVT ppx:  dvt prophylaxis per orthopedic protoco 72 female admitted for elective right  knee replacement of O.A:    -Primary severe osteoarthritis of joint s/p right tkr:   Pain control with opiates prn  Continue with bowel regimen.   Physical Therapy and OT.      - h/o breast cancer 2001:  no active issues.    - post-op nausea and epigastric discomfort:  resolved.   zofran prn. PPI daily.    -Elevated fasting glucose x1: possibly secondary to stress induced from surgery.   check Hga1c and tomorrow's fasting glucose in am.     -DVT ppx:  dvt prophylaxis per orthopedic protocol

## 2018-07-26 NOTE — PROGRESS NOTE ADULT - SUBJECTIVE AND OBJECTIVE BOX
Orthopedic P.A.- POD# 1 - s/p Right TKR    Patient alert and comfortable in bed with oral meds for pain control.  Denies knee pain or nausea.    Vital Signs Last 24 Hrs  T(C): 36.8 (26 Jul 2018 11:12), Max: 36.8 (26 Jul 2018 11:12)  T(F): 98.2 (26 Jul 2018 11:12), Max: 98.2 (26 Jul 2018 11:12)  HR: 52 (26 Jul 2018 11:12) (50 - 70)  BP: 102/56 (26 Jul 2018 11:12) (95/70 - 126/64)  BP(mean): --  RR: 16 (26 Jul 2018 11:12) (13 - 18)  SpO2: 98% (26 Jul 2018 11:12) (95% - 100%)         I&O's Detail    25 Jul 2018 07:01  -  26 Jul 2018 07:00  --------------------------------------------------------  IN:    IV PiggyBack: 300 mL    lactated ringers.: 1800 mL    lactated ringers.: 1100 mL    Oral Fluid: 860 mL  Total IN: 4060 mL    OUT:    Estimated Blood Loss: 100 mL    Voided: 1150 mL  Total OUT: 1250 mL    Total NET: 2810 mL        Labs:                                           11.6   12.43<H> )-----------( 158      ( 26 Jul 2018 07:20 )             34.1<L>  26 Jul 2018 07:20                                26 Jul 2018 07:20    139    |  105    |  11     ----------------------------<  135<H>  4.8     |  29     |  0.78     Ca    9.1        26 Jul 2018 07:20        MEDICATIONS:acetaminophen   Tablet. 1000 milliGRAM(s) Oral every 8 hours  aluminum hydroxide/magnesium hydroxide/simethicone Suspension 30 milliLiter(s) Oral four times a day PRN  apixaban 2.5 milliGRAM(s) Oral <User Schedule>  celecoxib 200 milliGRAM(s) Oral every 12 hours  docusate sodium 100 milliGRAM(s) Oral three times a day  HYDROmorphone  Injectable 0.5 milliGRAM(s) IV Push every 3 hours PRN  lactated ringers. 1000 milliLiter(s) IV Continuous <Continuous>  magnesium hydroxide Suspension 30 milliLiter(s) Oral daily PRN  ondansetron Injectable 4 milliGRAM(s) IV Push every 6 hours PRN  oxyCODONE    IR 5 milliGRAM(s) Oral every 3 hours PRN  oxyCODONE    IR 10 milliGRAM(s) Oral every 3 hours PRN  pantoprazole    Tablet 40 milliGRAM(s) Oral daily  polyethylene glycol 3350 17 Gram(s) Oral daily PRN  senna 2 Tablet(s) Oral at bedtime    Anticoagulation:  apixaban 2.5 milliGRAM(s) Oral every 123 hours started this AM      Antibiotics: complete      Pain medications:   acetaminophen   Tablet. 1000 milliGRAM(s) Oral every 8 hours  celecoxib 200 milliGRAM(s) Oral every 12 hours  HYDROmorphone  Injectable 0.5 milliGRAM(s) IV Push every 3 hours PRN  ondansetron Injectable 4 milliGRAM(s) IV Push every 6 hours PRN  oxyCODONE    IR 5 milliGRAM(s) Oral every 3 hours PRN  oxyCODONE    IR 10 milliGRAM(s) Oral every 3 hours PRN                                      Physical Exam:  Right knee- Primary surgical bandage dry and intact.   Neurovascular grossly intact LE's.  PAS on LE's.  Calves soft and non-tender.                                                                                                                                                          A/P:  Orthopedically stable.  -Continue pain management with above plan  -DVT prophylaxis with 12 days of Eliquis (high Caprini score) and 4 additional weeks of Ecotrin.  -Check labs again tomorrow; Labs OK today  -Increase ambulation and ROM right knee with PT/OT  -Dr. Mckenna for medical care toay  -Discharge planning for Home with home care services tomorrow.  -Further plan as per attendings.

## 2018-07-26 NOTE — DISCHARGE NOTE ADULT - PLAN OF CARE
Improve ambulation, ADLs and quality of life Physical Therapy/Occupational Therapy for ambulation, transfers, stairs, ADL's, Range of Motion Exercises, Isometrics.  Full weight bearing as tolerated with rolling walker  Range of Motion Goals: Flexion 120 degrees; Extension 0 degrees  Keep incision clean and dry.  Suture/prineo dressing removal 14 days after surgery at rehab facility or Surgeon's office  May shower post-op day #5 if no drainage from incision DVT prophylaxis once Eliquis complete take Ecotrin 162mg twice a day x 4 weeks Pt noted to have platelets drop to 138 while on Eliquis. Pt to have repeat cbc on monday 07/30/18.  [phone] Dr Pedro Porter to follow results. Pt verbalized understanding.

## 2018-07-26 NOTE — DISCHARGE NOTE ADULT - MEDICATION SUMMARY - MEDICATIONS TO TAKE
I will START or STAY ON the medications listed below when I get home from the hospital:    rolling walker  -- Dx: Right TKR  -- Indication: For equipment    CBC with platelet count  -- Blood draw on Monday 7/30/18 to check CBC with platelet count    Call results to Dr. Pedro Amos     #[phone]  -- Indication: For anemia    oxyCODONE 5 mg oral tablet  -- 1 tab(s) by mouth every 3 hours, As needed, Pain (1 - 3) MDD:8  Reference #: [id number]   -- Indication: For Pain    meloxicam 15 mg oral tablet  -- 1 tab(s) by mouth once a day   -- Do not take this drug if you are pregnant.  Obtain medical advice before taking any non-prescription drugs as some may affect the action of this medication.  Take with food or milk.    -- Indication: For Pain    acetaminophen 500 mg oral tablet  -- 2 tab(s) by mouth every 8 hours for 3 weeks for pain mgmt  -- Indication: For Pain    apixaban 2.5 mg oral tablet  -- 1 tab(s) by mouth 2 times a day   -- Indication: For DVT prophylaxis, start Ecotrin 162mg twice a day x 4 weeks once comleted    docusate sodium 100 mg oral capsule  -- 1 cap(s) by mouth 3 times a day  -- Indication: For constipation    senna oral tablet  -- 2 tab(s) by mouth once a day (at bedtime)  -- Indication: For constipation    pantoprazole 40 mg oral delayed release tablet  -- 1 tab(s) by mouth once a day  -- Indication: For acid reflux

## 2018-07-26 NOTE — DISCHARGE NOTE ADULT - DURABLE MEDICAL EQUIPMENT AGENCY
[facility] [phone]: Lakia Blake, [employer] [facility] [phone]: Rolling Walker

## 2018-07-26 NOTE — DISCHARGE NOTE ADULT - NS AS ACTIVITY OBS
No Heavy lifting/straining/Do not make important decisions/Do not drive or operate machinery/Stairs allowed/Showering allowed

## 2018-07-27 VITALS
RESPIRATION RATE: 16 BRPM | SYSTOLIC BLOOD PRESSURE: 132 MMHG | HEART RATE: 61 BPM | TEMPERATURE: 98 F | DIASTOLIC BLOOD PRESSURE: 65 MMHG | OXYGEN SATURATION: 98 %

## 2018-07-27 LAB
ANION GAP SERPL CALC-SCNC: 6 MMOL/L — SIGNIFICANT CHANGE UP (ref 5–17)
BUN SERPL-MCNC: 17 MG/DL — SIGNIFICANT CHANGE UP (ref 7–23)
CALCIUM SERPL-MCNC: 8.9 MG/DL — SIGNIFICANT CHANGE UP (ref 8.4–10.5)
CHLORIDE SERPL-SCNC: 106 MMOL/L — SIGNIFICANT CHANGE UP (ref 96–108)
CO2 SERPL-SCNC: 27 MMOL/L — SIGNIFICANT CHANGE UP (ref 22–31)
CREAT SERPL-MCNC: 0.82 MG/DL — SIGNIFICANT CHANGE UP (ref 0.5–1.3)
GLUCOSE SERPL-MCNC: 97 MG/DL — SIGNIFICANT CHANGE UP (ref 70–99)
HBA1C BLD-MCNC: 5.5 % — SIGNIFICANT CHANGE UP (ref 4–5.6)
HCT VFR BLD CALC: 31.7 % — LOW (ref 34.5–45)
HGB BLD-MCNC: 10.6 G/DL — LOW (ref 11.5–15.5)
MCHC RBC-ENTMCNC: 30.4 PG — SIGNIFICANT CHANGE UP (ref 27–34)
MCHC RBC-ENTMCNC: 33.4 GM/DL — SIGNIFICANT CHANGE UP (ref 32–36)
MCV RBC AUTO: 90.8 FL — SIGNIFICANT CHANGE UP (ref 80–100)
NRBC # BLD: 0 /100 WBCS — SIGNIFICANT CHANGE UP (ref 0–0)
PLATELET # BLD AUTO: 135 K/UL — LOW (ref 150–400)
POTASSIUM SERPL-MCNC: 4 MMOL/L — SIGNIFICANT CHANGE UP (ref 3.5–5.3)
POTASSIUM SERPL-SCNC: 4 MMOL/L — SIGNIFICANT CHANGE UP (ref 3.5–5.3)
RBC # BLD: 3.49 M/UL — LOW (ref 3.8–5.2)
RBC # FLD: 13.6 % — SIGNIFICANT CHANGE UP (ref 10.3–14.5)
SODIUM SERPL-SCNC: 139 MMOL/L — SIGNIFICANT CHANGE UP (ref 135–145)
WBC # BLD: 7.65 K/UL — SIGNIFICANT CHANGE UP (ref 3.8–10.5)
WBC # FLD AUTO: 7.65 K/UL — SIGNIFICANT CHANGE UP (ref 3.8–10.5)

## 2018-07-27 PROCEDURE — C1713: CPT

## 2018-07-27 PROCEDURE — C1776: CPT

## 2018-07-27 PROCEDURE — 97530 THERAPEUTIC ACTIVITIES: CPT

## 2018-07-27 PROCEDURE — 99238 HOSP IP/OBS DSCHRG MGMT 30/<: CPT

## 2018-07-27 PROCEDURE — 83036 HEMOGLOBIN GLYCOSYLATED A1C: CPT

## 2018-07-27 PROCEDURE — 80048 BASIC METABOLIC PNL TOTAL CA: CPT

## 2018-07-27 PROCEDURE — 88305 TISSUE EXAM BY PATHOLOGIST: CPT

## 2018-07-27 PROCEDURE — 73562 X-RAY EXAM OF KNEE 3: CPT

## 2018-07-27 PROCEDURE — 36415 COLL VENOUS BLD VENIPUNCTURE: CPT

## 2018-07-27 PROCEDURE — 88311 DECALCIFY TISSUE: CPT

## 2018-07-27 PROCEDURE — C1889: CPT

## 2018-07-27 PROCEDURE — 97535 SELF CARE MNGMENT TRAINING: CPT

## 2018-07-27 PROCEDURE — 97165 OT EVAL LOW COMPLEX 30 MIN: CPT

## 2018-07-27 PROCEDURE — 97161 PT EVAL LOW COMPLEX 20 MIN: CPT

## 2018-07-27 PROCEDURE — 97110 THERAPEUTIC EXERCISES: CPT

## 2018-07-27 PROCEDURE — 85027 COMPLETE CBC AUTOMATED: CPT

## 2018-07-27 PROCEDURE — 97116 GAIT TRAINING THERAPY: CPT

## 2018-07-27 RX ORDER — DOCUSATE SODIUM 100 MG
1 CAPSULE ORAL
Qty: 0 | Refills: 0 | DISCHARGE
Start: 2018-07-27

## 2018-07-27 RX ORDER — ACETAMINOPHEN 500 MG
2 TABLET ORAL
Qty: 0 | Refills: 0 | DISCHARGE
Start: 2018-07-27

## 2018-07-27 RX ORDER — MELOXICAM 15 MG/1
1 TABLET ORAL
Qty: 21 | Refills: 0
Start: 2018-07-27 | End: 2018-08-16

## 2018-07-27 RX ORDER — OXYCODONE HYDROCHLORIDE 5 MG/1
1 TABLET ORAL
Qty: 56 | Refills: 0
Start: 2018-07-27 | End: 2018-08-02

## 2018-07-27 RX ORDER — SENNA PLUS 8.6 MG/1
2 TABLET ORAL
Qty: 0 | Refills: 0 | DISCHARGE
Start: 2018-07-27

## 2018-07-27 RX ADMIN — Medication 1000 MILLIGRAM(S): at 13:42

## 2018-07-27 RX ADMIN — PANTOPRAZOLE SODIUM 40 MILLIGRAM(S): 20 TABLET, DELAYED RELEASE ORAL at 13:42

## 2018-07-27 RX ADMIN — CELECOXIB 200 MILLIGRAM(S): 200 CAPSULE ORAL at 06:21

## 2018-07-27 RX ADMIN — CELECOXIB 200 MILLIGRAM(S): 200 CAPSULE ORAL at 05:33

## 2018-07-27 RX ADMIN — Medication 1000 MILLIGRAM(S): at 14:12

## 2018-07-27 RX ADMIN — APIXABAN 2.5 MILLIGRAM(S): 2.5 TABLET, FILM COATED ORAL at 09:41

## 2018-07-27 RX ADMIN — Medication 1000 MILLIGRAM(S): at 05:33

## 2018-07-27 RX ADMIN — Medication 1000 MILLIGRAM(S): at 06:21

## 2018-07-27 NOTE — PROGRESS NOTE ADULT - ASSESSMENT
72 female admitted for elective right  knee replacement of O.A:    -Primary severe osteoarthritis of joint s/p right tkr:   Pain control with opiates prn  Continue with bowel regimen.   Physical Therapy and OT.      - h/o breast cancer 2001:  no active issues.    - post-op nausea and epigastric discomfort:  resolved.   zofran prn. PPI daily.    -Elevated fasting glucose x1:   possibly secondary to stress induced from surgery.   improved.     -Thrombocytopenia:   pt noted to have decrease in platelets. Pt to have cbc on Monday with follow up results with Dr Porter.      -DVT ppx:  dvt prophylaxis per orthopedic protocol    -Dispo:  Dc home today.

## 2018-07-27 NOTE — PROGRESS NOTE ADULT - SUBJECTIVE AND OBJECTIVE BOX
Orthopedic P.A.- POD# 2 - s/p Right TKR    Patient alert and comfortable in bed with oral meds for pain control.  Denies knee pain or nausea. +BM post op    Vital Signs Last 24 Hrs  T(C): 36.8 (27 Jul 2018 07:18), Max: 36.9 (26 Jul 2018 19:00)  T(F): 98.2 (27 Jul 2018 07:18), Max: 98.5 (26 Jul 2018 19:00)  HR: 61 (27 Jul 2018 07:18) (52 - 61)  BP: 132/65 (27 Jul 2018 07:18) (102/56 - 137/69)  BP(mean): --  RR: 16 (27 Jul 2018 07:18) (15 - 16)  SpO2: 98% (27 Jul 2018 07:18) (97% - 98%)         I&O's Detail             Labs:                                                    10.6<L>  7.65  )-----------( 135<L>    ( 27 Jul 2018 07:31 )             31.7<L>  27 Jul 2018 07:31                                27 Jul 2018 07:31    139    |  106    |  17     ----------------------------<  97     4.0     |  27     |  0.82     Ca    8.9        27 Jul 2018 07:31        MEDICATIONS:acetaminophen   Tablet. 1000 milliGRAM(s) Oral every 8 hours  aluminum hydroxide/magnesium hydroxide/simethicone Suspension 30 milliLiter(s) Oral four times a day PRN  apixaban 2.5 milliGRAM(s) Oral every 12 hours  bisacodyl Suppository 10 milliGRAM(s) Rectal daily PRN  celecoxib 200 milliGRAM(s) Oral every 12 hours  docusate sodium 100 milliGRAM(s) Oral three times a day  HYDROmorphone  Injectable 0.5 milliGRAM(s) IV Push every 3 hours PRN  lactated ringers. 1000 milliLiter(s) IV Continuous <Continuous>  magnesium hydroxide Suspension 30 milliLiter(s) Oral daily PRN  ondansetron Injectable 4 milliGRAM(s) IV Push every 6 hours PRN  oxyCODONE    IR 5 milliGRAM(s) Oral every 3 hours PRN  oxyCODONE    IR 10 milliGRAM(s) Oral every 3 hours PRN  pantoprazole    Tablet 40 milliGRAM(s) Oral daily  polyethylene glycol 3350 17 Gram(s) Oral daily PRN  senna 2 Tablet(s) Oral at bedtime    Anticoagulation:  apixaban 2.5 milliGRAM(s) Oral every 12 hours      Pain medications:   acetaminophen   Tablet. 1000 milliGRAM(s) Oral every 8 hours  celecoxib 200 milliGRAM(s) Oral every 12 hours  HYDROmorphone  Injectable 0.5 milliGRAM(s) IV Push every 3 hours PRN  ondansetron Injectable 4 milliGRAM(s) IV Push every 6 hours PRN  oxyCODONE    IR 5 milliGRAM(s) Oral every 3 hours PRN  oxyCODONE    IR 10 milliGRAM(s) Oral every 3 hours PRN                                      Physical Exam:  Right knee- Primary surgical bandage removed and sterile bandage placed over dry and intact sutured surgical wound. Neurovascular grossly intact LE's.  PAS on LE's.  Calves soft and non-tender.                                                                                                                                                          A/P:  Orthopedically stable.  -Continue pain management with above plan.  -DVT prophylaxis with 12 days of Eliquis then 4 more weeks of Ecotrin.  -Monitor decreasing platelets.; repeat CBC in 3 days at home and f/u with surgeon (Eliquis may cause thrombocytopenia)  -PT/OT for clearance for safe ambulation and stairs.  -Dr. Mckenna for medical clearance for discharge home today.  -further plan as per attendings.

## 2018-07-27 NOTE — PROGRESS NOTE ADULT - SUBJECTIVE AND OBJECTIVE BOX
chief complaint of "Dr Cruz is going to replace my RIGHT knee" (2018 15:58)    HPI:  73 yo female with pmh of breast cancer sp right lumpectomy, OA sp elective right  knee replacement on 18.     Today:  Pt eager to go home.     REVIEW OF SYSTEMS:  General: no lethargy	  Skin/Breast: no rash  Ophthalmologic: no blurry vision  ENMT:	no sore throat, no ear pain  Respiratory and Thorax:	no sob, no cough, wheezing  Cardiovascular:	no chest pain, no palpitations, no lower extremity swelling  Gastrointestinal:	no nausea, no vomiting,   Genitourinary: no dysuria, no frequency	  Musculoskeletal:	 no muscle pain  Neurological: no weakness  Psychiatric: no anxiety	  Hematology/Lymphatics:	 no bruising  Endocrine: no increased thirst, no change in appetite    PAST MEDICAL & SURGICAL HISTORY:  BMI 30.0-30.9,adult  History of breast cancer:   Osteoarthritis of right knee  Status post rotator cuff repair: right, 2012  History of : x2 ,   S/P lumpectomy of breast: right breast     MEDICATIONS  (STANDING):  acetaminophen   Tablet. 1000 milliGRAM(s) Oral every 8 hours  apixaban 2.5 milliGRAM(s) Oral every 12 hours  celecoxib 200 milliGRAM(s) Oral every 12 hours  docusate sodium 100 milliGRAM(s) Oral three times a day  lactated ringers. 1000 milliLiter(s) (100 mL/Hr) IV Continuous <Continuous>  pantoprazole    Tablet 40 milliGRAM(s) Oral daily  senna 2 Tablet(s) Oral at bedtime    MEDICATIONS  (PRN):  aluminum hydroxide/magnesium hydroxide/simethicone Suspension 30 milliLiter(s) Oral four times a day PRN Indigestion  bisacodyl Suppository 10 milliGRAM(s) Rectal daily PRN If no bowel movement by postoperative day #2  HYDROmorphone  Injectable 0.5 milliGRAM(s) IV Push every 3 hours PRN Severe Pain (7 - 10)  magnesium hydroxide Suspension 30 milliLiter(s) Oral daily PRN Constipation  ondansetron Injectable 4 milliGRAM(s) IV Push every 6 hours PRN Nausea and/or Vomiting  oxyCODONE    IR 5 milliGRAM(s) Oral every 3 hours PRN Mild Pain (1 - 3)  oxyCODONE    IR 10 milliGRAM(s) Oral every 3 hours PRN Moderate Pain (4 - 6)  polyethylene glycol 3350 17 Gram(s) Oral daily PRN Constipation    EXAM:  Vital Signs Last 24 Hrs  T(C): 36.8 (2018 07:18), Max: 36.9 (2018 19:00)  T(F): 98.2 (2018 07:18), Max: 98.5 (2018 19:00)  HR: 61 (2018 07:18) (52 - 61)  BP: 132/65 (2018 07:18) (112/51 - 137/69)  BP(mean): --  RR: 16 (2018 07:18) (15 - 16)  SpO2: 98% (2018 07:18) (97% - 98%)    PHYSICAL EXAM:  Constitutional: awake in bed.   ENMT: patent nares, moist mucus membranes  Neck: supple  Respiratory: bilaterally clear to auscultation, no wheezing, no rhonchi, no crackles, no decreased air entry  Cardiovascular: s1s2, rrr, no murmurs.   Gastrointestinal: soft, non tender, +bowel sounds, no rebound, no guarding.   Extremities: +ve edema to RLE with ACE dressing.   Neurological: alert and oriented to person, date and place.   Skin: no rash, warm to touch.   Musculoskeletal: moves all 4 extremities  Psychiatric: appropriate affect.       LABS:                      10.6   7.65  )-----------( 135      ( 2018 07:31 )             31.7   07-  139  |  106  |  17  ----------------------------<  97  4.0   |  27  |  0.82  Ca    8.9      2018 07:31

## 2018-08-13 ENCOUNTER — APPOINTMENT (OUTPATIENT)
Dept: ORTHOPEDIC SURGERY | Facility: CLINIC | Age: 73
End: 2018-08-13
Payer: MEDICARE

## 2018-08-13 VITALS
HEIGHT: 60 IN | SYSTOLIC BLOOD PRESSURE: 113 MMHG | WEIGHT: 154 LBS | BODY MASS INDEX: 30.23 KG/M2 | HEART RATE: 73 BPM | DIASTOLIC BLOOD PRESSURE: 69 MMHG

## 2018-08-13 PROCEDURE — 99024 POSTOP FOLLOW-UP VISIT: CPT

## 2018-08-13 PROCEDURE — 73562 X-RAY EXAM OF KNEE 3: CPT | Mod: RT

## 2018-09-25 ENCOUNTER — APPOINTMENT (OUTPATIENT)
Dept: ORTHOPEDIC SURGERY | Facility: CLINIC | Age: 73
End: 2018-09-25
Payer: COMMERCIAL

## 2018-09-25 DIAGNOSIS — Z96.651 PRESENCE OF RIGHT ARTIFICIAL KNEE JOINT: ICD-10-CM

## 2018-09-25 PROCEDURE — 99024 POSTOP FOLLOW-UP VISIT: CPT

## 2018-09-25 PROCEDURE — 73562 X-RAY EXAM OF KNEE 3: CPT | Mod: RT

## 2019-11-12 ENCOUNTER — APPOINTMENT (OUTPATIENT)
Dept: ORTHOPEDIC SURGERY | Facility: CLINIC | Age: 74
End: 2019-11-12

## 2019-11-20 ENCOUNTER — RESULT REVIEW (OUTPATIENT)
Age: 74
End: 2019-11-20

## 2021-06-16 ENCOUNTER — EMERGENCY (EMERGENCY)
Facility: HOSPITAL | Age: 76
LOS: 1 days | Discharge: HOME | End: 2021-06-16
Attending: EMERGENCY MEDICINE
Payer: COMMERCIAL

## 2021-06-16 VITALS
WEIGHT: 154.98 LBS | TEMPERATURE: 98 F | DIASTOLIC BLOOD PRESSURE: 81 MMHG | HEART RATE: 86 BPM | OXYGEN SATURATION: 96 % | SYSTOLIC BLOOD PRESSURE: 149 MMHG | RESPIRATION RATE: 17 BRPM | HEIGHT: 61 IN

## 2021-06-16 VITALS
SYSTOLIC BLOOD PRESSURE: 155 MMHG | RESPIRATION RATE: 17 BRPM | HEART RATE: 84 BPM | TEMPERATURE: 98 F | OXYGEN SATURATION: 100 % | DIASTOLIC BLOOD PRESSURE: 87 MMHG

## 2021-06-16 DIAGNOSIS — Z98.890 OTHER SPECIFIED POSTPROCEDURAL STATES: Chronic | ICD-10-CM

## 2021-06-16 PROCEDURE — 90471 IMMUNIZATION ADMIN: CPT

## 2021-06-16 PROCEDURE — 12001 RPR S/N/AX/GEN/TRNK 2.5CM/<: CPT

## 2021-06-16 PROCEDURE — 90715 TDAP VACCINE 7 YRS/> IM: CPT

## 2021-06-16 PROCEDURE — 99283 EMERGENCY DEPT VISIT LOW MDM: CPT | Mod: 25

## 2021-06-16 PROCEDURE — 73140 X-RAY EXAM OF FINGER(S): CPT | Mod: 26,RT

## 2021-06-16 PROCEDURE — 73140 X-RAY EXAM OF FINGER(S): CPT

## 2021-06-16 RX ORDER — TETANUS TOXOID, REDUCED DIPHTHERIA TOXOID AND ACELLULAR PERTUSSIS VACCINE, ADSORBED 5; 2.5; 8; 8; 2.5 [IU]/.5ML; [IU]/.5ML; UG/.5ML; UG/.5ML; UG/.5ML
0.5 SUSPENSION INTRAMUSCULAR ONCE
Refills: 0 | Status: COMPLETED | OUTPATIENT
Start: 2021-06-16 | End: 2021-06-16

## 2021-06-16 RX ORDER — CEPHALEXIN 500 MG
1 CAPSULE ORAL
Qty: 21 | Refills: 0
Start: 2021-06-16 | End: 2021-06-22

## 2021-06-16 RX ORDER — CEPHALEXIN 500 MG
500 CAPSULE ORAL ONCE
Refills: 0 | Status: COMPLETED | OUTPATIENT
Start: 2021-06-16 | End: 2021-06-16

## 2021-06-16 RX ORDER — IBUPROFEN 200 MG
600 TABLET ORAL ONCE
Refills: 0 | Status: COMPLETED | OUTPATIENT
Start: 2021-06-16 | End: 2021-06-16

## 2021-06-16 RX ORDER — ACETAMINOPHEN 500 MG
975 TABLET ORAL ONCE
Refills: 0 | Status: COMPLETED | OUTPATIENT
Start: 2021-06-16 | End: 2021-06-16

## 2021-06-16 RX ADMIN — TETANUS TOXOID, REDUCED DIPHTHERIA TOXOID AND ACELLULAR PERTUSSIS VACCINE, ADSORBED 0.5 MILLILITER(S): 5; 2.5; 8; 8; 2.5 SUSPENSION INTRAMUSCULAR at 12:45

## 2021-06-16 RX ADMIN — Medication 975 MILLIGRAM(S): at 12:41

## 2021-06-16 RX ADMIN — Medication 500 MILLIGRAM(S): at 12:42

## 2021-06-16 RX ADMIN — Medication 975 MILLIGRAM(S): at 13:38

## 2021-06-16 NOTE — ED PROVIDER NOTE - PATIENT PORTAL LINK FT
You can access the FollowMyHealth Patient Portal offered by [facility] by registering at the following website: [email or web address]. By joining [facility]’s FollowMyHealth portal, you will also be able to view your health information using other applications (apps) compatible with our system.

## 2021-06-16 NOTE — ED PROVIDER NOTE - PHYSICAL EXAMINATION
A&Ox3, NAD. NCAT. PERRL, EOMI Extremities: cap refill <2, pulses in distal extremities 4+, no edema. Skin without rash. CN II-XII intact. Strength 5/5 UE/LE. Sensations intact throughout, FROM to of all joint of right ring finger. NO fb, + right ring finger with 1.5 cm laceration to volar aspect of finger, no nail bed involvement. Gait steady.

## 2021-06-16 NOTE — ED ADULT NURSE NOTE - OBJECTIVE STATEMENT
76 y/o female coming to the ER with c/o right ring finger laceration. A&Ox4. Ambulatory. No significant PMH. Patient reports she was trimming her bushes when she cut her finger with the electric bush kavon. Patient reports she has no pain, but that it "burns" Patient has a laceration on the right ring finger with no active bleeding. Unknown last tetanus. Patient able to move her finger, no loss of sensation. Patient denies chest pain, SOB, dizziness, headache, fever, chills, n/v/d, urinary symptoms, abdominal pain. Safety measures maintained. Bed in the lowest position. Call bell within reach.  MD at the bedside. No acute distress noted or further complaints at this time.

## 2021-06-16 NOTE — ED PROVIDER NOTE - NS ED ROS FT
Constitutional: No fever or chills  MSK: see hpi  Skin: see hpi  Neuro: No headache. No numbness or tingling. No weakness.

## 2021-06-16 NOTE — ED PROVIDER NOTE - CLINICAL SUMMARY MEDICAL DECISION MAKING FREE TEXT BOX
Lac 2/2  involving small portion of nail plate/bed.  Does not appear to require nail plate removal for exploration.  XR to eval for fx, tdap, pain Rx, lac repair, prophylactic abx, likely dc.  --BMM

## 2021-06-16 NOTE — ED ADULT NURSE NOTE - NSIMPLEMENTINTERV_GEN_ALL_ED
Implemented All Universal Safety Interventions:  Ider to call system. Call bell, personal items and telephone within reach. Instruct patient to call for assistance. Room bathroom lighting operational. Non-slip footwear when patient is off stretcher. Physically safe environment: no spills, clutter or unnecessary equipment. Stretcher in lowest position, wheels locked, appropriate side rails in place.

## 2021-06-16 NOTE — ED PROVIDER NOTE - OBJECTIVE STATEMENT
76 yo female with no pmh, R hand dominant here for laceration to right ring finger while cutting her hedges. Pt denies numbness, tingling, weakness, did not take anything for the pain. cleansed wound and came here. pt not on ac, does not take any daily medications.

## 2021-06-16 NOTE — ED PROVIDER NOTE - CARE PROVIDER_API CALL
Trinity Sandhu (MD)  Plastic Surgery  [address] [place] [address], [address]  [place], [place] [id number]  Phone: [phone]  Fax: [phone]  Follow Up Time: 1-3 Days

## 2021-06-16 NOTE — ED PROVIDER NOTE - NSFOLLOWUPINSTRUCTIONS_ED_ALL_ED_FT
- stay hydrated.   - take tylenol 975mg and ibuprofen 600mg every 6 hours as needed for pain-take with meals.  -take keflex as prescribed  - follow up with your pcp in 1-2 days.  - follow up with hand surgeon, Dr. Sandhu within the next week for evaluation and to remove stitches in 7-10 days.   -remove bandage tomorrow, cleanse with warm soap and water and pat dry. Cover with bacitracin (or neosporin), cover with gauze and re-apply splint. Keep splint on until seen by hand doctor.   - return if symptoms worsen, fever, weakness, numbness/tingling, redness/discharge around the wound, you have worsening pain and all other concerns.

## 2021-08-06 NOTE — PRE-OP CHECKLIST - WARM FLUIDS/WARM BLANKETS
CHIEF COMPLAINT:    Leg pain     HISTORY OF PRESENT ILLNESS: Brianna Clement is a 65-year-old woman that I saw last in January of 2019 for bilateral leg pain.  Her neurologic examination at that time did not provide localizing features.  Reflexes were normal as was strength.  Based on her symptoms I questioned lumbar spinal stenosis.  We discussed MRI lumbar spine but she decided against having that study done.  A recently she has had an increase in back and leg pain with shooting pain into the groin.  In December, she had sharp shooting pain down the legs.  She saw her chiropractor who has been adjusting her neck on a monthly basis because of arthritis which she attributes to a dog sled accident and whiplash she did not know she had.  She reports this monthly adjustment prevents neck problems but the leg problems were addressed more recently by the chiropractor.  She reports the chiropractor also suspected she may have lumbar spinal stenosis.  She had x-rays which she brings with us and would like to loaded in the system.  She reports the x-rays were interpreted as having narrowing at L3-4 and 5 and some scoliosis to the left.  She also reports that bone spurs were identified in her left hip.  Her chiropractor recommended physical therapy and the chiropractor is also doing laser treatment to nerve roots.  She is now improving.  She has also been very faithful about doing prescribed exercises, from the physical therapist, twice a day.  She can walk 2 miles per day.  She has worked on her posture and abdominal strength.  She has pain in the thighs on each side particularly when sleeping on those sides.  She also has some aching pain.  She wants to make sure she is on the right path to addressing her back and leg problems.  Her physical therapist has recommended therapies through the end of the month.    Of note her mother sister and brother all have spinal disease.        PAST MEDICAL HISTORY:    Rosacea                                                        Migraine, unspecified, without mention of intr*               Compartment Syndrome                            1/22/2001       Comment: Left leg    Special screening for malignant neoplasms, col* 12/30/2005      Comment: Tubular adenoma, hyperplastic x 2.    Chronic rhinitis                                12/31/2007    Generalized osteoarthrosis, unspecified site                    Comment: Neck    Other specified anemias                                         Comment: Due to uterine fibroids, resolved    Benign neoplasm of colon                        01/13/2012      Comment: Hyperplastic polyps x 3    Hyperplastic colonic polyp x 1                  06/29/2017    Basal cell carcinoma (BCC)                      10/19           Comment: nose    Basal cell carcinoma                            08/18/2020      Comment: left nasal tip    Sciatica                                        03/09/2021      Comment: bilateral, alternating    Current Outpatient Medications   Medication Sig Dispense Refill   • ciclopirox (Penlac) 8 % topical solution Apply thin film to left great toenail once daily. Remove once weekly with nail polish remover. Repeat. 6.6 mL 5   • metroNIDAZOLE (METROCREAM) 0.75 % cream Apply topically 2 times daily. 45 g 4   • Ascorbic Acid (VITAMIN C CR) 500 MG TBCR Take 2 tablets by mouth 2 times daily. 1 tablet 0   • Glucosamine-Chondroitin (GLUCOSAMINE CHONDRO COMPLEX) 500-400 MG OR TABS TWO TABLETS DAILY 0 0   • CALCIUM 600 + D 600-200 MG-UNIT PO TABS  0 0   • FLAX SEED OIL 1000 MG PO CAPS ONE CAPSULE TWICE DAILY 0 0   • FISH OIL 1000 MG PO CAPS ONE CAPSULE TWICE DAILY 0 0   • MULTI-VITAMIN TABS   PO  1qd 0 0     No current facility-administered medications for this visit.             NEUROLOGICAL EXAMINATION:  GENERAL:  Well appearing woman in no distress    Visit Vitals  /79 (BP Location: LUE - Left upper extremity, Patient Position: Sitting, Cuff Size:  Regular)   Pulse 68   Temp 96.9 °F (36.1 °C) (Temporal)   Wt 94.3 kg   BMI 32.58 kg/m²            MOTOR EXAMINATION:   Muscle strength was graded 5/5 throughout.    Muscle tone and bulk normal    REFLEXES                        Right                          Left  Biceps                                   2/4                            2/4  Triceps                                  2/4                            2/4   Brachioradialis                      2/4                            2/4  Patellar                                  2/4                            2/4  Achilles                                  1/4                            1/4  Plantar response               Flexor                        Flexor    SENSORY EXAMINATION:     Vibration sense 8 secs ankle right.  GAIT AND STATION:  Routine gait, toe, heel and tandem maneuver was normal.      IMPRESSION and PLAN:  Brianna Clement Is a 68-year-old woman who returns with bilateral leg pain that may reflect radiculopathy.  Symptoms are improved with physical therapy.  I do not think there is a need for imaging at this time but if symptoms recur, that may be the next step.  I think the yield of imaging would be greater than electrodiagnostic studies.  She should continue physical therapy, as planned.   Other options would be pain management if symptoms are not resolved with physical therapy. .  She has the appointment with Dr. Erum nguyen next Monday.  She is encouraged to call their office to see if she should continue with that appointment, given her symptoms are improved.  It may be prudent for her to be established with Dr. Fry in the event symptoms increase but I encouraged that discussion with Dr. Fry's office.  The patient will be seen as needed.  She will call if her pain symptoms increase.    Total time on the date of the encounter:  30 minutes including face-to face and non face-to-face time.       no

## 2022-01-06 ENCOUNTER — RESULT REVIEW (OUTPATIENT)
Age: 77
End: 2022-01-06

## 2022-01-06 ENCOUNTER — APPOINTMENT (OUTPATIENT)
Dept: INTERNAL MEDICINE | Facility: CLINIC | Age: 77
End: 2022-01-06
Payer: MEDICARE

## 2022-01-06 ENCOUNTER — NON-APPOINTMENT (OUTPATIENT)
Age: 77
End: 2022-01-06

## 2022-01-06 VITALS
HEART RATE: 96 BPM | RESPIRATION RATE: 12 BRPM | TEMPERATURE: 97.5 F | SYSTOLIC BLOOD PRESSURE: 142 MMHG | OXYGEN SATURATION: 98 % | HEIGHT: 60 IN | DIASTOLIC BLOOD PRESSURE: 76 MMHG | BODY MASS INDEX: 31.12 KG/M2 | WEIGHT: 158.51 LBS

## 2022-01-06 VITALS — DIASTOLIC BLOOD PRESSURE: 76 MMHG | SYSTOLIC BLOOD PRESSURE: 132 MMHG

## 2022-01-06 DIAGNOSIS — Z23 ENCOUNTER FOR IMMUNIZATION: ICD-10-CM

## 2022-01-06 PROCEDURE — G0008: CPT

## 2022-01-06 PROCEDURE — G0439: CPT

## 2022-01-06 PROCEDURE — 90662 IIV NO PRSV INCREASED AG IM: CPT

## 2022-01-06 RX ORDER — AMOXICILLIN 500 MG/1
500 CAPSULE ORAL
Qty: 20 | Refills: 4 | Status: DISCONTINUED | COMMUNITY
Start: 2018-09-25 | End: 2022-01-06

## 2022-01-11 LAB
25(OH)D3 SERPL-MCNC: 82.1 NG/ML
ALBUMIN SERPL ELPH-MCNC: 4.5 G/DL
ALP BLD-CCNC: 86 U/L
ALT SERPL-CCNC: 19 U/L
ANION GAP SERPL CALC-SCNC: 10 MMOL/L
APPEARANCE: CLEAR
AST SERPL-CCNC: 26 U/L
BASOPHILS # BLD AUTO: 0.05 K/UL
BASOPHILS NFR BLD AUTO: 0.8 %
BILIRUB SERPL-MCNC: 0.4 MG/DL
BILIRUBIN URINE: NEGATIVE
BLOOD URINE: NEGATIVE
BUN SERPL-MCNC: 11 MG/DL
CALCIUM SERPL-MCNC: 10.3 MG/DL
CHLORIDE SERPL-SCNC: 98 MMOL/L
CO2 SERPL-SCNC: 30 MMOL/L
COLOR: NORMAL
COVID-19 SPIKE DOMAIN ANTIBODY INTERPRETATION: POSITIVE
CREAT SERPL-MCNC: 0.76 MG/DL
EOSINOPHIL # BLD AUTO: 0.12 K/UL
EOSINOPHIL NFR BLD AUTO: 2 %
ESTIMATED AVERAGE GLUCOSE: 111 MG/DL
GLUCOSE QUALITATIVE U: NEGATIVE
GLUCOSE SERPL-MCNC: 96 MG/DL
HBA1C MFR BLD HPLC: 5.5 %
HCT VFR BLD CALC: 40.6 %
HGB BLD-MCNC: 13.2 G/DL
IMM GRANULOCYTES NFR BLD AUTO: 0.5 %
KETONES URINE: NEGATIVE
LEUKOCYTE ESTERASE URINE: NEGATIVE
LYMPHOCYTES # BLD AUTO: 1.82 K/UL
LYMPHOCYTES NFR BLD AUTO: 30.6 %
MAN DIFF?: NORMAL
MCHC RBC-ENTMCNC: 30.9 PG
MCHC RBC-ENTMCNC: 32.5 GM/DL
MCV RBC AUTO: 95.1 FL
MONOCYTES # BLD AUTO: 0.48 K/UL
MONOCYTES NFR BLD AUTO: 8.1 %
NEUTROPHILS # BLD AUTO: 3.45 K/UL
NEUTROPHILS NFR BLD AUTO: 58 %
NITRITE URINE: NEGATIVE
PH URINE: 7
PLATELET # BLD AUTO: 193 K/UL
POTASSIUM SERPL-SCNC: 4.4 MMOL/L
PROT SERPL-MCNC: 8 G/DL
PROTEIN URINE: NEGATIVE
RBC # BLD: 4.27 M/UL
RBC # FLD: 13 %
SARS-COV-2 AB SERPL IA-ACNC: >250 U/ML
SODIUM SERPL-SCNC: 137 MMOL/L
SPECIFIC GRAVITY URINE: 1
TSH SERPL-ACNC: 2.37 UIU/ML
UROBILINOGEN URINE: NORMAL
VIT B12 SERPL-MCNC: 866 PG/ML
WBC # FLD AUTO: 5.95 K/UL

## 2022-01-16 NOTE — HISTORY OF PRESENT ILLNESS
[de-identified] : 76 year old female with history of Right breast cancer s/p lumpectomy and radiation 2001 presents for annual  physical \par \par She feels well, offers no complaints\par She walks daily and maintains a healthy diet\par \par Denies any CP, SOB, HA, N/V or abdominal pain

## 2022-01-16 NOTE — PHYSICAL EXAM
[No Acute Distress] : no acute distress [Well Nourished] : well nourished [Normal Sclera/Conjunctiva] : normal sclera/conjunctiva [EOMI] : extraocular movements intact [Normal Outer Ear/Nose] : the outer ears and nose were normal in appearance [Normal Oropharynx] : the oropharynx was normal [No JVD] : no jugular venous distention [No Lymphadenopathy] : no lymphadenopathy [Supple] : supple [Thyroid Normal, No Nodules] : the thyroid was normal and there were no nodules present [No Respiratory Distress] : no respiratory distress  [No Accessory Muscle Use] : no accessory muscle use [Clear to Auscultation] : lungs were clear to auscultation bilaterally [Normal Rate] : normal rate  [Regular Rhythm] : with a regular rhythm [Normal S1, S2] : normal S1 and S2 [No Murmur] : no murmur heard [No Carotid Bruits] : no carotid bruits [Pedal Pulses Present] : the pedal pulses are present [No Edema] : there was no peripheral edema [No Extremity Clubbing/Cyanosis] : no extremity clubbing/cyanosis [Soft] : abdomen soft [Non Tender] : non-tender [Non-distended] : non-distended [Normal Bowel Sounds] : normal bowel sounds [Normal Posterior Cervical Nodes] : no posterior cervical lymphadenopathy [Normal Anterior Cervical Nodes] : no anterior cervical lymphadenopathy [No CVA Tenderness] : no CVA  tenderness [No Spinal Tenderness] : no spinal tenderness [No Joint Swelling] : no joint swelling [Grossly Normal Strength/Tone] : grossly normal strength/tone [No Rash] : no rash [Coordination Grossly Intact] : coordination grossly intact [No Focal Deficits] : no focal deficits [Normal Gait] : normal gait [Normal Affect] : the affect was normal [Normal Insight/Judgement] : insight and judgment were intact [de-identified] : +varicose vein

## 2022-01-16 NOTE — PLAN
[FreeTextEntry1] : Physical \par \par Flu shot today \par \par Prevnar sent to pharmacy \par \par UTD with colonoscopy\par \par Referral for Mammo, Bone density, and opthalmology given today \par \par Weight management, Healthy food choices, and increased physical active discussed \par \par Labs ordered pt to follow-up in 1 week for results

## 2022-01-20 LAB
CHOLEST SERPL-MCNC: 205 MG/DL
HDLC SERPL-MCNC: 106 MG/DL
LDLC SERPL CALC-MCNC: 82 MG/DL
NONHDLC SERPL-MCNC: 98 MG/DL
TRIGL SERPL-MCNC: 83 MG/DL

## 2022-03-15 ENCOUNTER — APPOINTMENT (OUTPATIENT)
Dept: ULTRASOUND IMAGING | Facility: CLINIC | Age: 77
End: 2022-03-15
Payer: MEDICARE

## 2022-03-15 ENCOUNTER — APPOINTMENT (OUTPATIENT)
Dept: MAMMOGRAPHY | Facility: CLINIC | Age: 77
End: 2022-03-15
Payer: MEDICARE

## 2022-03-15 ENCOUNTER — RESULT REVIEW (OUTPATIENT)
Age: 77
End: 2022-03-15

## 2022-03-15 ENCOUNTER — APPOINTMENT (OUTPATIENT)
Dept: RADIOLOGY | Facility: CLINIC | Age: 77
End: 2022-03-15
Payer: MEDICARE

## 2022-03-15 PROCEDURE — 77085 DXA BONE DENSITY AXL VRT FX: CPT

## 2022-03-15 PROCEDURE — 77066 DX MAMMO INCL CAD BI: CPT

## 2022-03-15 PROCEDURE — 76641 ULTRASOUND BREAST COMPLETE: CPT | Mod: 50

## 2022-03-15 PROCEDURE — G0279: CPT

## 2022-03-22 ENCOUNTER — RESULT REVIEW (OUTPATIENT)
Age: 77
End: 2022-03-22

## 2022-03-22 ENCOUNTER — APPOINTMENT (OUTPATIENT)
Dept: ULTRASOUND IMAGING | Facility: CLINIC | Age: 77
End: 2022-03-22
Payer: MEDICARE

## 2022-03-22 ENCOUNTER — APPOINTMENT (OUTPATIENT)
Dept: MAMMOGRAPHY | Facility: CLINIC | Age: 77
End: 2022-03-22
Payer: MEDICARE

## 2022-03-22 PROCEDURE — 19083 BX BREAST 1ST LESION US IMAG: CPT | Mod: RT

## 2022-03-22 PROCEDURE — 77065 DX MAMMO INCL CAD UNI: CPT | Mod: RT

## 2022-03-28 ENCOUNTER — NON-APPOINTMENT (OUTPATIENT)
Age: 77
End: 2022-03-28

## 2022-03-31 ENCOUNTER — NON-APPOINTMENT (OUTPATIENT)
Age: 77
End: 2022-03-31

## 2022-03-31 ENCOUNTER — APPOINTMENT (OUTPATIENT)
Dept: SURGICAL ONCOLOGY | Facility: CLINIC | Age: 77
End: 2022-03-31
Payer: MEDICARE

## 2022-03-31 VITALS
WEIGHT: 155 LBS | HEIGHT: 60 IN | SYSTOLIC BLOOD PRESSURE: 143 MMHG | RESPIRATION RATE: 15 BRPM | HEART RATE: 60 BPM | BODY MASS INDEX: 30.43 KG/M2 | TEMPERATURE: 98.4 F | DIASTOLIC BLOOD PRESSURE: 84 MMHG | OXYGEN SATURATION: 98 %

## 2022-03-31 PROCEDURE — 99205 OFFICE O/P NEW HI 60 MIN: CPT

## 2022-03-31 NOTE — ASSESSMENT
[FreeTextEntry1] : IMP: 76 year female present for new right breast cancer \par \par PLAN: \par -Mastectomy discussed and declined\par -Lumpectomy with sentinel node biopsy and adjuvant Arimidex was decided on as a compromise as repeat radiation is not an option.\par - Genetic testing done today\par -Oncotype Dx will be done on the tumor

## 2022-03-31 NOTE — CONSULT LETTER
[Dear  ___] : Dear  [unfilled], [Consult Letter:] : I had the pleasure of evaluating your patient, [unfilled]. [Please see my note below.] : Please see my note below. [Consult Closing:] : Thank you very much for allowing me to participate in the care of this patient.  If you have any questions, please do not hesitate to contact me. [Sincerely,] : Sincerely, [FreeTextEntry1] : I will keep you informed of the final pathology. [FreeTextEntry3] : Magdi Snell MD FACS\par Chief of Surgical Oncology\par \par

## 2022-03-31 NOTE — PHYSICAL EXAM
[Normal] : supple, no neck mass and thyroid not enlarged [Normal Supraclavicular Lymph Nodes] : normal supraclavicular lymph nodes [Normal Axillary Lymph Nodes] : normal axillary lymph nodes [Normal] : oriented to person, place and time, with appropriate affect [de-identified] : Ecchymosis from the biopsy and an old RUQ and axillary scar but no masses bilaterally

## 2022-03-31 NOTE — HISTORY OF PRESENT ILLNESS
[de-identified] : Ms. ARNAUD NASH is a 76 year old female who presents today for initial consultation for right breast cancer. \par this was diagnosed by sono-guided biopsy of an incidental mamographic finding.\par \par The pathology showed an infiltrating ductal carcinoma ( ER +, MS +, HER-2neu -)\par \par She did have a right breast lumpectomy and axillary dissection with radiation and 5 years of Tamoxifen for a right breast cancer 20 years ago.she has not had any recurrence since that time.\par \par Family History: Brain cancer in brother  \par \par B/L mammo/sono 3/15/22: \par - 1.1 cm suspicious mass in the lateral right breast \par - US guided core biopsy is recommended.\par - BIRADS 5 \par \par Right breast 9:00 biopsy 3/22/22: Invasive moderately to poorly differentiated ductal carcinoma with focal micropapillary features. \par - Invasive tumor measures at least 0.4 cm in one core \par - Ductal carcinoma in situ, not identified \par -ER+/MS+/HER2- \par \par Pt denies any palpable lumps noted in the breast, axillae, and neck bilaterally. \par

## 2022-04-10 ENCOUNTER — FORM ENCOUNTER (OUTPATIENT)
Age: 77
End: 2022-04-10

## 2022-04-11 ENCOUNTER — NON-APPOINTMENT (OUTPATIENT)
Age: 77
End: 2022-04-11

## 2022-04-11 ENCOUNTER — OUTPATIENT (OUTPATIENT)
Dept: OUTPATIENT SERVICES | Facility: HOSPITAL | Age: 77
LOS: 1 days | End: 2022-04-11

## 2022-04-11 VITALS
HEIGHT: 60 IN | OXYGEN SATURATION: 98 % | HEART RATE: 65 BPM | SYSTOLIC BLOOD PRESSURE: 111 MMHG | WEIGHT: 156.97 LBS | DIASTOLIC BLOOD PRESSURE: 93 MMHG | TEMPERATURE: 98 F | RESPIRATION RATE: 118 BRPM

## 2022-04-11 DIAGNOSIS — M12.9 ARTHROPATHY, UNSPECIFIED: Chronic | ICD-10-CM

## 2022-04-11 DIAGNOSIS — I10 ESSENTIAL (PRIMARY) HYPERTENSION: ICD-10-CM

## 2022-04-11 DIAGNOSIS — N63.0 UNSPECIFIED LUMP IN UNSPECIFIED BREAST: ICD-10-CM

## 2022-04-11 DIAGNOSIS — C50.911 MALIGNANT NEOPLASM OF UNSPECIFIED SITE OF RIGHT FEMALE BREAST: ICD-10-CM

## 2022-04-11 DIAGNOSIS — Z98.890 OTHER SPECIFIED POSTPROCEDURAL STATES: Chronic | ICD-10-CM

## 2022-04-11 LAB
ANION GAP SERPL CALC-SCNC: 14 MMOL/L — SIGNIFICANT CHANGE UP (ref 7–14)
BUN SERPL-MCNC: 13 MG/DL — SIGNIFICANT CHANGE UP (ref 7–23)
CALCIUM SERPL-MCNC: 10 MG/DL — SIGNIFICANT CHANGE UP (ref 8.4–10.5)
CHLORIDE SERPL-SCNC: 102 MMOL/L — SIGNIFICANT CHANGE UP (ref 98–107)
CO2 SERPL-SCNC: 25 MMOL/L — SIGNIFICANT CHANGE UP (ref 22–31)
CREAT SERPL-MCNC: 0.74 MG/DL — SIGNIFICANT CHANGE UP (ref 0.5–1.3)
EGFR: 85 ML/MIN/1.73M2 — SIGNIFICANT CHANGE UP
GLUCOSE SERPL-MCNC: 89 MG/DL — SIGNIFICANT CHANGE UP (ref 70–99)
HCT VFR BLD CALC: 39.2 % — SIGNIFICANT CHANGE UP (ref 34.5–45)
HGB BLD-MCNC: 13.4 G/DL — SIGNIFICANT CHANGE UP (ref 11.5–15.5)
MCHC RBC-ENTMCNC: 31 PG — SIGNIFICANT CHANGE UP (ref 27–34)
MCHC RBC-ENTMCNC: 34.2 GM/DL — SIGNIFICANT CHANGE UP (ref 32–36)
MCV RBC AUTO: 90.7 FL — SIGNIFICANT CHANGE UP (ref 80–100)
NRBC # BLD: 0 /100 WBCS — SIGNIFICANT CHANGE UP
NRBC # FLD: 0 K/UL — SIGNIFICANT CHANGE UP
PLATELET # BLD AUTO: 199 K/UL — SIGNIFICANT CHANGE UP (ref 150–400)
POTASSIUM SERPL-MCNC: 4.7 MMOL/L — SIGNIFICANT CHANGE UP (ref 3.5–5.3)
POTASSIUM SERPL-SCNC: 4.7 MMOL/L — SIGNIFICANT CHANGE UP (ref 3.5–5.3)
RBC # BLD: 4.32 M/UL — SIGNIFICANT CHANGE UP (ref 3.8–5.2)
RBC # FLD: 12.7 % — SIGNIFICANT CHANGE UP (ref 10.3–14.5)
SODIUM SERPL-SCNC: 141 MMOL/L — SIGNIFICANT CHANGE UP (ref 135–145)
WBC # BLD: 7.17 K/UL — SIGNIFICANT CHANGE UP (ref 3.8–10.5)
WBC # FLD AUTO: 7.17 K/UL — SIGNIFICANT CHANGE UP (ref 3.8–10.5)

## 2022-04-11 RX ORDER — DORZOLAMIDE HYDROCHLORIDE 20 MG/ML
0 SOLUTION/ DROPS OPHTHALMIC
Qty: 0 | Refills: 0 | DISCHARGE

## 2022-04-11 RX ORDER — SODIUM CHLORIDE 9 MG/ML
1000 INJECTION, SOLUTION INTRAVENOUS
Refills: 0 | Status: DISCONTINUED | OUTPATIENT
Start: 2022-04-20 | End: 2022-05-04

## 2022-04-11 RX ORDER — MULTIVIT-MIN/FERROUS GLUCONATE 9 MG/15 ML
1 LIQUID (ML) ORAL
Qty: 0 | Refills: 0 | DISCHARGE

## 2022-04-11 NOTE — H&P PST ADULT - PROBLEM SELECTOR PLAN 1
This is a 77 y/o female who is scheduled for right breast magseed lumpectomy x 1 site sentinel node biopsy injection in the OR on 4-20-22  * Instructed to speak with surgeon regarding covid testing  * Given preop and cleanser instructions with good teach back and patient verbalized understanding

## 2022-04-11 NOTE — H&P PST ADULT - NSICDXFAMILYHX_GEN_ALL_CORE_FT
FAMILY HISTORY:  Father  Still living? No  Family history of stroke (cerebrovascular), Age at diagnosis: Age Unknown    Sibling  Still living? No  Family history of brain tumor, Age at diagnosis: Age Unknown

## 2022-04-11 NOTE — H&P PST ADULT - NSICDXPASTSURGICALHX_GEN_ALL_CORE_FT
PAST SURGICAL HISTORY:  Arthropathy right knee replacement in 2018    History of  x2 ,     S/P lumpectomy of breast right breast     Status post rotator cuff repair right,

## 2022-04-11 NOTE — H&P PST ADULT - HISTORY OF PRESENT ILLNESS
This is  a 75 y/o female who presents with h/o right breast cancer 21 years ago with subsequent excision right breast cancer with post op radiation therapy and 5 years of tamoxifen. Presents with recent abnormal finding of the right breast on sonogram, mammography, and eventual biopsy. Further intervention warranted. Scheduled for right breast magseed localized lumpectomy x 1 site sentinel node biopsy injection in OR

## 2022-04-11 NOTE — H&P PST ADULT - NSICDXPASTMEDICALHX_GEN_ALL_CORE_FT
PAST MEDICAL HISTORY:  BMI 30.0-30.9,adult     Breast cancer right side in 2022 -- to have surgery on 4-20-22    H/O: glaucoma     History of breast cancer 2001 right side with subseqent lumpectomy with post op radiation therapy and 5 years of tamoxfen    Osteoarthritis of right knee

## 2022-04-13 ENCOUNTER — APPOINTMENT (OUTPATIENT)
Dept: INTERNAL MEDICINE | Facility: CLINIC | Age: 77
End: 2022-04-13
Payer: MEDICARE

## 2022-04-13 ENCOUNTER — NON-APPOINTMENT (OUTPATIENT)
Age: 77
End: 2022-04-13

## 2022-04-13 VITALS
HEIGHT: 60 IN | SYSTOLIC BLOOD PRESSURE: 160 MMHG | WEIGHT: 159 LBS | BODY MASS INDEX: 31.22 KG/M2 | HEART RATE: 74 BPM | DIASTOLIC BLOOD PRESSURE: 79 MMHG | OXYGEN SATURATION: 97 % | TEMPERATURE: 97.3 F | RESPIRATION RATE: 12 BRPM

## 2022-04-13 VITALS — SYSTOLIC BLOOD PRESSURE: 140 MMHG | DIASTOLIC BLOOD PRESSURE: 80 MMHG

## 2022-04-13 DIAGNOSIS — Z01.818 ENCOUNTER FOR OTHER PREPROCEDURAL EXAMINATION: ICD-10-CM

## 2022-04-13 PROCEDURE — 99214 OFFICE O/P EST MOD 30 MIN: CPT

## 2022-04-13 NOTE — HISTORY OF PRESENT ILLNESS
[FreeTextEntry1] : Rt breast lumpectomy  [FreeTextEntry2] : 4/20/22 [FreeTextEntry3] : Dr. Snell [FreeTextEntry4] : 76 years old female with history of right breast cancer status post lumpectomy and axillary dissection with radiation/ chemo 2001 /currently with recurrence of the right breast cancer presents for medical clearance prior to right breast lumpectomy. \par Patient has good functional capacity.  She denies chest pain, shortness of breath, dizziness, abdominal pain.

## 2022-04-13 NOTE — PLAN
[FreeTextEntry1] : 76 years old female medically stable for planned surgery\par EKG: Normal sinus rhythm at 62 beats per minutes without acute ischemic changes\par Labs reviewed: Stable

## 2022-04-14 ENCOUNTER — APPOINTMENT (OUTPATIENT)
Dept: ULTRASOUND IMAGING | Facility: IMAGING CENTER | Age: 77
End: 2022-04-14
Payer: MEDICARE

## 2022-04-14 ENCOUNTER — RESULT REVIEW (OUTPATIENT)
Age: 77
End: 2022-04-14

## 2022-04-14 ENCOUNTER — OUTPATIENT (OUTPATIENT)
Dept: OUTPATIENT SERVICES | Facility: HOSPITAL | Age: 77
LOS: 1 days | End: 2022-04-14
Payer: MEDICARE

## 2022-04-14 DIAGNOSIS — M12.9 ARTHROPATHY, UNSPECIFIED: Chronic | ICD-10-CM

## 2022-04-14 DIAGNOSIS — Z98.890 OTHER SPECIFIED POSTPROCEDURAL STATES: Chronic | ICD-10-CM

## 2022-04-14 DIAGNOSIS — Z00.8 ENCOUNTER FOR OTHER GENERAL EXAMINATION: ICD-10-CM

## 2022-04-14 PROBLEM — C50.919 MALIGNANT NEOPLASM OF UNSPECIFIED SITE OF UNSPECIFIED FEMALE BREAST: Chronic | Status: ACTIVE | Noted: 2022-04-11

## 2022-04-14 PROBLEM — Z85.3 PERSONAL HISTORY OF MALIGNANT NEOPLASM OF BREAST: Chronic | Status: ACTIVE | Noted: 2018-07-13

## 2022-04-14 PROBLEM — Z86.69 PERSONAL HISTORY OF OTHER DISEASES OF THE NERVOUS SYSTEM AND SENSE ORGANS: Chronic | Status: ACTIVE | Noted: 2022-04-11

## 2022-04-14 PROCEDURE — C1739: CPT

## 2022-04-14 PROCEDURE — 19285 PERQ DEV BREAST 1ST US IMAG: CPT

## 2022-04-14 PROCEDURE — 19285 PERQ DEV BREAST 1ST US IMAG: CPT | Mod: RT

## 2022-04-19 ENCOUNTER — RESULT REVIEW (OUTPATIENT)
Age: 77
End: 2022-04-19

## 2022-04-19 VITALS
SYSTOLIC BLOOD PRESSURE: 137 MMHG | OXYGEN SATURATION: 100 % | RESPIRATION RATE: 16 BRPM | HEIGHT: 60 IN | HEART RATE: 60 BPM | WEIGHT: 156.97 LBS | DIASTOLIC BLOOD PRESSURE: 59 MMHG | TEMPERATURE: 98 F

## 2022-04-19 NOTE — ASU PREOPERATIVE ASSESSMENT, ADULT (IPARK ONLY) - FALL HARM RISK - TYPE OF ASSESSMENT
Patient called back and is still in pain.  She is taking Advil x6 every 4 hours.  She is still having hip pain and groin pain   She was given Flexeril at ER yesterday but wasn't able to have it filled.  Advised patient pathology is benign per Dr. Barker.  She will  Flexeril and see if that helps.  
Admission

## 2022-04-19 NOTE — ASU PREOPERATIVE ASSESSMENT, ADULT (IPARK ONLY) - PERIPHERAL IV: INSERTION DATE
Call to patient's daughter/Veronica.  Veronica denies any complaints related to anticoagulation therapy at this time. Veronica reports no change in  diet or health. Reinforced with Veronica to call clinic with any medication changes as this can impact INR. Reinforced signs and symptoms bleeding/clotting with Veronica. Veronica aware to seek medical care if signs and symptoms develop. Advised that if patient falls and/or hits their head, they should seek medical attention. Verbalizes understanding.     Dosing instructions given to patient verbally over the phone. Advised to call the clinic with any questions or concerns. Patient verbalizes understanding. Has clinic number.    Anticoagulation Summary  As of 2019    INR goal:   2.5-3.5   TTR:   45.8 % (6.9 y)   INR used for dosin.1! 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 (CMS/HCC) [I74.3]  Antiphospholipid antibody with hypercoagulable state (CMS/[facility]) [D68.61]             Anticoagulation Episode Summary     INR check location:   Clinic Lab    Preferred lab:       Send INR reminders to:   ANTICOAG (OPEN ENROLLMENT) ACS CARD/EP    Comments:   Next STAC due 18; ACL; 3 mg tabs; Call Veronica/daughter w/ INR/[phone]; takes warfarin in AM; Unable to do ACL home draw due to pt lives [place]      Anticoagulation Care Providers     Provider Role Specialty Phone number    Vignesh Hoang MD Referring Cardiovascular Disease [phone]          Supervising provider: MELVIN Badillo     20-Apr-2022

## 2022-04-19 NOTE — ASU PREOPERATIVE ASSESSMENT, ADULT (IPARK ONLY) - FALL HARM RISK - UNIVERSAL INTERVENTIONS
Bed in lowest position, wheels locked, appropriate side rails in place/Call bell, personal items and telephone in reach/Instruct patient to call for assistance before getting out of bed or chair/Non-slip footwear when patient is out of bed/[place] to call system/Physically safe environment - no spills, clutter or unnecessary equipment/Purposeful Proactive Rounding/Room/bathroom lighting operational, light cord in reach

## 2022-04-20 ENCOUNTER — RESULT REVIEW (OUTPATIENT)
Age: 77
End: 2022-04-20

## 2022-04-20 ENCOUNTER — TRANSCRIPTION ENCOUNTER (OUTPATIENT)
Age: 77
End: 2022-04-20

## 2022-04-20 ENCOUNTER — OUTPATIENT (OUTPATIENT)
Dept: OUTPATIENT SERVICES | Facility: HOSPITAL | Age: 77
LOS: 1 days | End: 2022-04-20
Payer: COMMERCIAL

## 2022-04-20 ENCOUNTER — APPOINTMENT (OUTPATIENT)
Dept: NUCLEAR MEDICINE | Facility: IMAGING CENTER | Age: 77
End: 2022-04-20
Payer: MEDICARE

## 2022-04-20 ENCOUNTER — APPOINTMENT (OUTPATIENT)
Dept: MAMMOGRAPHY | Facility: IMAGING CENTER | Age: 77
End: 2022-04-20
Payer: MEDICARE

## 2022-04-20 ENCOUNTER — APPOINTMENT (OUTPATIENT)
Dept: SURGICAL ONCOLOGY | Facility: AMBULATORY SURGERY CENTER | Age: 77
End: 2022-04-20

## 2022-04-20 ENCOUNTER — OUTPATIENT (OUTPATIENT)
Dept: OUTPATIENT SERVICES | Facility: HOSPITAL | Age: 77
LOS: 1 days | Discharge: HOME | End: 2022-04-20
Payer: COMMERCIAL

## 2022-04-20 VITALS
OXYGEN SATURATION: 98 % | DIASTOLIC BLOOD PRESSURE: 66 MMHG | SYSTOLIC BLOOD PRESSURE: 126 MMHG | HEART RATE: 60 BPM | TEMPERATURE: 97 F | RESPIRATION RATE: 12 BRPM

## 2022-04-20 DIAGNOSIS — M12.9 ARTHROPATHY, UNSPECIFIED: Chronic | ICD-10-CM

## 2022-04-20 DIAGNOSIS — Z00.8 ENCOUNTER FOR OTHER GENERAL EXAMINATION: ICD-10-CM

## 2022-04-20 DIAGNOSIS — C50.911 MALIGNANT NEOPLASM OF UNSPECIFIED SITE OF RIGHT FEMALE BREAST: ICD-10-CM

## 2022-04-20 DIAGNOSIS — Z98.890 OTHER SPECIFIED POSTPROCEDURAL STATES: Chronic | ICD-10-CM

## 2022-04-20 PROCEDURE — 76098 X-RAY EXAM SURGICAL SPECIMEN: CPT | Mod: 26

## 2022-04-20 PROCEDURE — 88307 TISSUE EXAM BY PATHOLOGIST: CPT | Mod: 26

## 2022-04-20 PROCEDURE — 19302 P-MASTECTOMY W/LN REMOVAL: CPT | Mod: RT

## 2022-04-20 PROCEDURE — 38792 RA TRACER ID OF SENTINL NODE: CPT | Mod: 59,RT

## 2022-04-20 PROCEDURE — 14001 TIS TRNFR TRUNK 10.1-30SQCM: CPT

## 2022-04-20 PROCEDURE — 38900 IO MAP OF SENT LYMPH NODE: CPT | Mod: RT

## 2022-04-20 PROCEDURE — 38790 INJECT FOR LYMPHATIC X-RAY: CPT | Mod: 59,RT

## 2022-04-20 PROCEDURE — 88305 TISSUE EXAM BY PATHOLOGIST: CPT | Mod: 26

## 2022-04-20 PROCEDURE — A9541: CPT

## 2022-04-20 PROCEDURE — 76098 X-RAY EXAM SURGICAL SPECIMEN: CPT

## 2022-04-20 NOTE — ASU DISCHARGE PLAN (ADULT/PEDIATRIC) - CALL YOUR DOCTOR IF YOU HAVE ANY OF THE FOLLOWING:
Bleeding that does not stop/Swelling that gets worse/Pain not relieved by Medications/Inability to tolerate liquids or foods

## 2022-04-20 NOTE — ASU DISCHARGE PLAN (ADULT/PEDIATRIC) - CARE PROVIDER_API CALL
Magdi Snell)  Surgery  [address] [place] [address], [place] [address]  Phone: [phone]  Fax: [phone]  Follow Up Time: 2 weeks

## 2022-04-20 NOTE — ASU DISCHARGE PLAN (ADULT/PEDIATRIC) - PAIN MANAGEMENT
next tylenol containing medication 9:05pm/Prescriptions electronically submitted to pharmacy from [facility]

## 2022-04-20 NOTE — ASU DISCHARGE PLAN (ADULT/PEDIATRIC) - PROCEDURE
Right Breast Lumpectomy and Axillary [place] Lymph Node Biopsy Right Breast Lumpectomy and Axillary [place] Lymph Node Biopsy

## 2022-04-20 NOTE — ASU DISCHARGE PLAN (ADULT/PEDIATRIC) - NS MD DC FALL RISK RISK
For information on Fall & Injury Prevention, visit: [email or web address] OR  [email or web address] OR  https://www.cdc.gov/steadi/patient.html

## 2022-05-04 LAB — SURGICAL PATHOLOGY STUDY: SIGNIFICANT CHANGE UP

## 2022-05-05 ENCOUNTER — APPOINTMENT (OUTPATIENT)
Dept: SURGICAL ONCOLOGY | Facility: CLINIC | Age: 77
End: 2022-05-05
Payer: MEDICARE

## 2022-05-05 ENCOUNTER — NON-APPOINTMENT (OUTPATIENT)
Age: 77
End: 2022-05-05

## 2022-05-05 VITALS
OXYGEN SATURATION: 98 % | HEIGHT: 60 IN | DIASTOLIC BLOOD PRESSURE: 85 MMHG | SYSTOLIC BLOOD PRESSURE: 136 MMHG | BODY MASS INDEX: 30.43 KG/M2 | HEART RATE: 79 BPM | TEMPERATURE: 97.5 F | WEIGHT: 155 LBS | RESPIRATION RATE: 16 BRPM

## 2022-05-05 PROCEDURE — 99024 POSTOP FOLLOW-UP VISIT: CPT

## 2022-05-05 NOTE — CONSULT LETTER
[Dear  ___] : Dear  [unfilled], [Courtesy Letter:] : I had the pleasure of seeing your patient, [unfilled], in my office today. [Please see my note below.] : Please see my note below. [Consult Closing:] : Thank you very much for allowing me to participate in the care of this patient.  If you have any questions, please do not hesitate to contact me. [Sincerely,] : Sincerely, [FreeTextEntry1] : I will keep you informed of the oncotype Dx results and our subsequent plans. [FreeTextEntry3] : Magdi Snell MD FACS\par Chief of Surgical Oncology\par \par

## 2022-05-05 NOTE — HISTORY OF PRESENT ILLNESS
[de-identified] : Ms. ARNAUD NASH is a 76 year old female who presents today for post op s/p right breast lumpectomy with SLNB on 4/202/22. Final Path: ER/[place]+, Her2 neg, 9mm IDC, 2mm DCIS\par Incision healing well\par right breast cancer. \par this was diagnosed by sono-guided biopsy of an incidental mamographic finding.\par \par The pathology showed an infiltrating ductal carcinoma ( ER +, [place] +, HER-2neu -)\par \par She did have a right breast lumpectomy and axillary dissection with radiation and 5 years of Tamoxifen for a right breast cancer 20 years ago.she has not had any recurrence since that time.\par \par Family History: Brain cancer in brother  \par \par B/L mammo/sono 3/15/22: \par - 1.1 cm suspicious mass in the lateral right breast \par - US guided core biopsy is recommended.\par - BIRADS 5 \par \par Right breast 9:00 biopsy 3/22/22: Invasive moderately to poorly differentiated ductal carcinoma with focal micropapillary features. \par - Invasive tumor measures at least 0.4 cm in one core \par - Ductal carcinoma in situ, not identified \par -ER+/[place]+/HER2- \par \par Pt denies any palpable lumps noted in the breast, axillae, and neck bilaterally. \par \par BRCA negative

## 2022-05-05 NOTE — PHYSICAL EXAM
[Normal] : supple, no neck mass and thyroid not enlarged [Normal Neck Lymph Nodes] : normal neck lymph nodes  [Normal Supraclavicular Lymph Nodes] : normal supraclavicular lymph nodes [Normal Groin Lymph Nodes] : normal groin lymph nodes [Normal Axillary Lymph Nodes] : normal axillary lymph nodes [Normal] : oriented to person, place and time, with appropriate affect [de-identified] : R breast incision healing well and R axillary LN healing well

## 2022-05-05 NOTE — ASSESSMENT
[FreeTextEntry1] : IMP: 76 year female h/o lumpectomy s/p tamoxifen and XRT 20yr ago in right breast,  present for new right breast cancer \par s/p right breast lumpectomy w/SLNB on 4/20/22, path - 9mm IDC with 2mm DCIS, margins negative, SLNB negative, ER/SD+, her2 neg\par \par PLAN: \par \par - FU med onc\par - FU rad onc\par - oncotype Dx

## 2022-05-16 ENCOUNTER — APPOINTMENT (OUTPATIENT)
Dept: SURGICAL ONCOLOGY | Facility: CLINIC | Age: 77
End: 2022-05-16
Payer: MEDICARE

## 2022-05-16 VITALS
WEIGHT: 155 LBS | OXYGEN SATURATION: 98 % | HEIGHT: 60 IN | RESPIRATION RATE: 16 BRPM | SYSTOLIC BLOOD PRESSURE: 136 MMHG | DIASTOLIC BLOOD PRESSURE: 78 MMHG | BODY MASS INDEX: 30.43 KG/M2 | HEART RATE: 82 BPM

## 2022-05-16 DIAGNOSIS — N64.89 OTHER SPECIFIED DISORDERS OF BREAST: ICD-10-CM

## 2022-05-16 PROCEDURE — 10140 I&D HMTMA SEROMA/FLUID COLLJ: CPT | Mod: 79

## 2022-05-16 PROCEDURE — 99024 POSTOP FOLLOW-UP VISIT: CPT

## 2022-05-16 NOTE — HISTORY OF PRESENT ILLNESS
[de-identified] : Ms. ARNAUD NASH is a 76 year old female who presents today for post op s/p right breast lumpectomy with SLNB on 4/202/22. Final Path: ER/IN+, Her2 neg, 9mm IDC, 2mm DCIS. \par Incision healing well; Pt. states she noticed right breast swelling since Friday.  Reports mild discomfort. Denies fever, chills. Denies redness at the site.  Pt. is scheduled to see med-onc and rad-onc this week. \par \par PERTINENT HISTORY:\par right breast cancer. \par this was diagnosed by sono-guided biopsy of an incidental mamographic finding.\par \par The pathology showed an infiltrating ductal carcinoma ( ER +, [place] +, HER-2neu -)\par \par She did have a right breast lumpectomy and axillary dissection with radiation and 5 years of Tamoxifen for a right breast cancer 20 years ago.she has not had any recurrence since that time.\par \par Family History: Brain cancer in brother  \par \par B/L mammo/sono 3/15/22: \par - 1.1 cm suspicious mass in the lateral right breast \par - US guided core biopsy is recommended.\par - BIRADS 5 \par \par Right breast 9:00 biopsy 3/22/22: Invasive moderately to poorly differentiated ductal carcinoma with focal micropapillary features. \par - Invasive tumor measures at least 0.4 cm in one core \par - Ductal carcinoma in situ, not identified \par -ER+/[place]+/HER2- \par \par Pt denies any palpable lumps noted in the breast, axillae, and neck bilaterally. \par \par BRCA negative

## 2022-05-16 NOTE — ASSESSMENT
[FreeTextEntry1] : IMP: \par 76 year female h/o lumpectomy s/p tamoxifen and XRT 20yr ago in right breast,  present for new right breast cancer \par s/p right breast lumpectomy w/SLNB on 4/20/22, path - 9mm IDC with 2mm DCIS, margins negative, SLNB negative, ER/[place]+, her2 neg\par Right breast seroma \par Oral consent was obtained\par \par The area (   ) was prepped with alcohol.\par \par Local anesthesia was instilled with 1% lidocaine.\par \par The seroma was I and D'ed for     70 cc's.\par \par A sterile dressing was applied.\par \par The patient tolerated the procedure well.\par \par Follow-up instructions were given.\par \par \par PLAN: \par -Wear tight fitting bra and do ice compression \par - FU med onc\par - FU rad onc\par - oncotype Dx\par -RTO 3 months

## 2022-05-16 NOTE — PHYSICAL EXAM
[Normal] : well developed, well nourished, in no acute distress [de-identified] : healing right breast incision with no evidence of infection; non infected seroma  [de-identified] : healing right axillary incision with no infection or seroma

## 2022-05-16 NOTE — REASON FOR VISIT
[Breast Cancer] : breast cancer [Post-Op] : a post-op for [Spouse] : spouse [FreeTextEntry2] : right breast lumpectomy ; right breast seroma

## 2022-05-18 ENCOUNTER — OUTPATIENT (OUTPATIENT)
Dept: OUTPATIENT SERVICES | Facility: HOSPITAL | Age: 77
LOS: 1 days | Discharge: HOME | End: 2022-05-18

## 2022-05-18 DIAGNOSIS — Z98.890 OTHER SPECIFIED POSTPROCEDURAL STATES: Chronic | ICD-10-CM

## 2022-05-18 DIAGNOSIS — Z12.39 ENCOUNTER FOR OTHER SCREENING FOR MALIGNANT NEOPLASM OF BREAST: ICD-10-CM

## 2022-05-18 DIAGNOSIS — M12.9 ARTHROPATHY, UNSPECIFIED: Chronic | ICD-10-CM

## 2022-05-19 ENCOUNTER — APPOINTMENT (OUTPATIENT)
Dept: RADIATION ONCOLOGY | Facility: CLINIC | Age: 77
End: 2022-05-19
Payer: MEDICARE

## 2022-05-19 VITALS
WEIGHT: 158.29 LBS | SYSTOLIC BLOOD PRESSURE: 131 MMHG | OXYGEN SATURATION: 100 % | DIASTOLIC BLOOD PRESSURE: 81 MMHG | BODY MASS INDEX: 31.08 KG/M2 | HEART RATE: 60 BPM | RESPIRATION RATE: 17 BRPM | TEMPERATURE: 96.44 F | HEIGHT: 60 IN

## 2022-05-19 PROCEDURE — 99204 OFFICE O/P NEW MOD 45 MIN: CPT | Mod: GC,25

## 2022-05-19 RX ORDER — PNEUMOCOCCAL 13-VALENT CONJUGATE VACCINE 2.2; 2.2; 2.2; 2.2; 2.2; 4.4; 2.2; 2.2; 2.2; 2.2; 2.2; 2.2; 2.2 UG/.5ML; UG/.5ML; UG/.5ML; UG/.5ML; UG/.5ML; UG/.5ML; UG/.5ML; UG/.5ML; UG/.5ML; UG/.5ML; UG/.5ML; UG/.5ML; UG/.5ML
INJECTION, SUSPENSION INTRAMUSCULAR
Qty: 1 | Refills: 0 | Status: DISCONTINUED | COMMUNITY
Start: 2022-01-06 | End: 2022-05-19

## 2022-05-21 NOTE — LETTER CLOSING
[Consult Closing:] : Thank you for allowing me to participate in the care of this patient.  If you have any questions, please do not hesitate to contact me. [Sincerely yours,] : Sincerely yours, [FreeTextEntry3] : Asya Mas MD\par

## 2022-05-21 NOTE — PHYSICAL EXAM
[] : no respiratory distress [Exaggerated Use Of Accessory Muscles For Inspiration] : no accessory muscle use [Heart Rate And Rhythm] : heart rate and rhythm were normal [Abdomen Soft] : soft [Nondistended] : nondistended [Supraclavicular Lymph Nodes Enlarged Bilaterally] : supraclavicular [Axillary Lymph Nodes Enlarged Bilaterally] : axillary [Normal] : oriented to person, place and time, the affect was normal, the mood was normal and not anxious [de-identified] : two surgical scars in right lateral breast, one well-healed scar and the other more recent one, clean and dry, biopsy estefani in the pper outer rright breast with bruising

## 2022-05-21 NOTE — REASON FOR VISIT
[Consideration of Curative Therapy] : consideration of curative therapy for [Breast Cancer] : breast cancer [Spouse] : spouse [Other: _____] : [unfilled]

## 2022-05-21 NOTE — HISTORY OF PRESENT ILLNESS
[FreeTextEntry1] : Ms. Flores is a 76 year old female with newly diagnosed right breast cancer, hormone receptor positive, who is s/p lumpectomy with SLNB and presents for radiotherapy recommendations.\par \par Her history is significant for a previous right sided breast cancer.  In 9/2001, she had a right breast lumpectomy and axillary dissection, followed by right breast radiation therapy.  Pathology from [facility] showed a 0.2 cm moderately differentiated invasive breast cancer with extensive intraductal component of high grade comedo type DCIS. The invasive tumor was ER 80%, IN 30% and HER2 positive. One right sentinel lymph node was negative for tumor.  She received adjuvant RT, reportedly for 6 weeks, followed by 2 years of tamoxifen and 3 years Arimidex. \par \par She continued regular mammography and was without evidence of disease until recently.  Recently, a diagnostic mammogram showed an irregularly marginated mass in the upper outer quadrant of the right breast measuring 11mm with associated microcalcifications. Ultrasound showed a solid mass with irregular margins measuring 11 x 4 x 8 mm at 9:00, 4 cm from the nipple. At 10:00, there was a post-surgical scar. There was no evidence of axillary lymphadenopathy. \par \par Ultrasound guided core biopsy of the right breast mass at 9:00 on 3/22/22 revealed invasive moderately to poorly differentiated ductal carcinoma with focal micropapillary features, Pretty score 7/9.  Invasive tumor measured at least 0.4 cm. There was no DCIS and no lymphvascular invasion. The tumor was ER positive > 95%, IN positive >90%, HER2 negative. \par \par She underwent right lumpectomy with SLNB on 4/20/22.  Pathology showed invasive moderately differentiated ductal carcinoma with micropapillary features, measuring  9 mm. DCIS was present with intermediate nuclear grade, measuring up to 2 mm in largest dimension, present in 2 out of 9 blocks.  Margins were negative, measuring at least 5 mm for both the invasive and intraductal components.  Right axillary sentinel LN excision showed skeletal muscle and fibroadipose tissue, with no lymphoid tissue present. Oncotype Dx results are pending.\par \par Today, she feels well, denies any erythema, discharge or pain in her right breast. Denies any issues with range of motion of her arm in setting of prior surgery .

## 2022-06-02 ENCOUNTER — NON-APPOINTMENT (OUTPATIENT)
Age: 77
End: 2022-06-02

## 2022-06-03 ENCOUNTER — APPOINTMENT (OUTPATIENT)
Dept: HEMATOLOGY ONCOLOGY | Facility: CLINIC | Age: 77
End: 2022-06-03

## 2022-06-03 VITALS
OXYGEN SATURATION: 98 % | RESPIRATION RATE: 16 BRPM | WEIGHT: 156.97 LBS | HEART RATE: 70 BPM | HEIGHT: 60 IN | TEMPERATURE: 207.14 F | DIASTOLIC BLOOD PRESSURE: 86 MMHG | BODY MASS INDEX: 30.82 KG/M2 | SYSTOLIC BLOOD PRESSURE: 133 MMHG

## 2022-06-03 DIAGNOSIS — Z00.00 ENCOUNTER FOR GENERAL ADULT MEDICAL EXAMINATION W/OUT ABNORMAL FINDINGS: ICD-10-CM

## 2022-06-03 PROCEDURE — 99205 OFFICE O/P NEW HI 60 MIN: CPT

## 2022-06-03 RX ORDER — COLD-HOT PACK
125 MCG EACH MISCELLANEOUS
Qty: 30 | Refills: 2 | Status: ACTIVE | COMMUNITY
Start: 2022-06-03

## 2022-07-03 NOTE — PHYSICAL EXAM
[Fully active, able to carry on all pre-disease performance without restriction] : Status 0 - Fully active, able to carry on all pre-disease performance without restriction [Normal] : affect appropriate [de-identified] : right lumpectomy healing well; left breast no mass

## 2022-07-03 NOTE — HISTORY OF PRESENT ILLNESS
[de-identified] : ARNAUD NASH  is a 76 year old female here for initial consultation for management of right breast cancer.\par  \par In 2001, she had a right breast lumpectomy and axillary dissection with Dr. Jolly, followed by right breast radiation therapy. Pathology from [facility] showed a 0.2 cm moderately differentiated invasive breast cancer with extensive intraductal component of high grade comedo type DCIS. The invasive tumor was ER 80%, [place] 30% and HER2 positive. One right sentinel lymph node was negative for tumor. She received adjuvant RT, reportedly for 6 weeks, followed by 2 years of tamoxifen and 3 years Arimidex by Dr. Marcus Conde.  \par \par She continued regular mammography and was without evidence of disease until recently. \par Recently, a diagnostic mammogram showed an irregularly marginated mass in the upper outer quadrant of the right breast measuring 11mm with associated microcalcifications. Ultrasound showed a solid mass with irregular margins measuring 11 x 4 x 8 mm at 9:00, 4 cm from the nipple. At 10:00, there was a post-surgical scar. There was no evidence of axillary lymphadenopathy. \par \par Ultrasound guided core biopsy of the right breast mass at 9:00 on 3/22/22 revealed invasive moderately to poorly differentiated ductal carcinoma with focal micropapillary features, Pretty score 7/9. Invasive tumor measured at least 0.4 cm. There was no DCIS and no lymphvascular invasion. The tumor was ER positive > 95%, [place] positive >90%, HER2 negative. \par \par She underwent right lumpectomy with SLNB on 22. Pathology showed invasive moderately differentiated ductal carcinoma with micropapillary features, measuring 9 mm. DCIS was present with intermediate nuclear grade, measuring up to 2 mm in largest dimension, present in 2 out of 9 blocks. Margins were negative, measuring at least 5 mm for both the invasive and intraductal components. Right axillary sentinel LN excision showed skeletal muscle and fibroadipose tissue, with no lymphoid tissue present. \par \par Oncotype Dx score 14. \par \par Genetics 3/31/22 [facility] genetics negative for 8 deleterious genes.  Paternal cousin breast cancer age 60's. Paternal cousin's daughter brain cancer age 40. Brain cancer in brother age 60. Other brother with likely BCC.  Limited family history due to immigration here. Of note,  was diagnosed with breast cancer. \par \par Risk factors: Prior breast disease: Menarche: 16. Postmenopausal, menses regular. . Age of first pregnancy 26. Breast feeding: no\par Oral contraceptive use: no. Other hormone exposure: None. The patient is not of Ashkenazi ethnic background. \par \par HEALTH MAINTENANCE \par PCP Dr. Rollins\par OPHTHO glaucoma\par BMD 3/15/22 osteopenia -1.7, on vit D\par GI 2 years ago ,up to date with colonoscopy,h/o polyps\par GYN pap smear 3 years ago,no vaginal bleeding or spotting\par s/p COVID vaccine and booster left arm

## 2022-07-03 NOTE — ASSESSMENT
[FreeTextEntry1] : ARNAUD NASH  is a 76 year old female with pmh of right breast moderately differentiated invasive breast cancer in 9/2001, pT1aN0,  ER 80%, [place] 30% and HER2 positive, s/p right breast lumpectomy and axillary dissection, s/p RT and 5 years of endocrine therapy.  Pt is here for initial consultation for new primary right breast invasive moderately to poorly differentiated ductal carcinoma, pT1bNx, ER positive > 95%, [place] positive >90%, HER2 negative. Oncotype Dx score 14. \par \par We discussed the role of surgery, systemic therapy and radiation in the treatment of breast cancer. \par Her oncotype DX recurrence score was 14 which indicates no significant benefit for adjuvant chemotherapy.\par Therefore, I recommend adjuvant treatment with anastrazole 1 mg daily for at least five years. \par We reviewed side effects which include but are not limited to hot flashes, arthralgias, nausea, vomiting, fatigue, fracture, mood disturbance, cardiac symptoms, vaginal dryness, hair loss.  Rx sent to pharmacy.\par We reviewed lifestyle modifications to decrease risk of breast cancer recurrence. \par Will continue to monitor bone density.\par Labs next visit.\par Recommend to continue to follow up with Dr. Snell and Dr. Mas as recommended.\par FU in 3months\par \par

## 2022-08-04 ENCOUNTER — APPOINTMENT (OUTPATIENT)
Dept: SURGICAL ONCOLOGY | Facility: CLINIC | Age: 77
End: 2022-08-04

## 2022-08-04 VITALS
RESPIRATION RATE: 17 BRPM | OXYGEN SATURATION: 99 % | SYSTOLIC BLOOD PRESSURE: 146 MMHG | HEART RATE: 72 BPM | WEIGHT: 155 LBS | TEMPERATURE: 208.04 F | DIASTOLIC BLOOD PRESSURE: 83 MMHG | BODY MASS INDEX: 30.43 KG/M2 | HEIGHT: 60 IN

## 2022-08-04 PROCEDURE — 99214 OFFICE O/P EST MOD 30 MIN: CPT

## 2022-08-04 NOTE — HISTORY OF PRESENT ILLNESS
[de-identified] : Ms. ARNAUD NASH is a 76 year old female who presents today for a follow up visit. She is s/p right breast lumpectomy with SLNB on 4/20/22. Final Path: ER/[place]+, Her2 neg, 9mm IDC, 2mm DCIS. Oncotype 14.\par \par Patient is feeling well. Incision healed. Denies fever or chills. Pt denies any palpable lumps noted in the breast, axillae, and neck bilaterally. \par \par Patient currently taking anastrozole under the care of Dr. Prescott without complaints. \par \par PERTINENT HISTORY:\par right breast cancer. \par this was diagnosed by sono-guided biopsy of an incidental mamographic finding.\par \par The pathology showed an infiltrating ductal carcinoma ( ER +, [place] +, HER-2neu -)\par \par She did have a right breast lumpectomy and axillary dissection with radiation and 5 years of Tamoxifen for a right breast cancer 20 years ago.she has not had any recurrence since that time.\par \par Family History: Brain cancer in brother  \par \par B/L mammo/sono 3/15/22: \par - 1.1 cm suspicious mass in the lateral right breast \par - US guided core biopsy is recommended.\par - BIRADS 5 \par \par Right breast 9:00 biopsy 3/22/22: Invasive moderately to poorly differentiated ductal carcinoma with focal micropapillary features. \par - Invasive tumor measures at least 0.4 cm in one core \par - Ductal carcinoma in situ, not identified \par -ER+/[place]+/HER2- \par \par BRCA negative

## 2022-08-04 NOTE — CONSULT LETTER
[Dear  ___] : Dear  [unfilled], [Courtesy Letter:] : I had the pleasure of seeing your patient, [unfilled], in my office today. [Please see my note below.] : Please see my note below. [Consult Closing:] : Thank you very much for allowing me to participate in the care of this patient.  If you have any questions, please do not hesitate to contact me. [Sincerely,] : Sincerely, [FreeTextEntry1] : I will keep you informed of the mammogram results in November. [FreeTextEntry3] : Magdi Snell MD FACS\par Chief of Surgical Oncology\par \par

## 2022-08-04 NOTE — PHYSICAL EXAM
[Normal] : supple, no neck mass and thyroid not enlarged [Normal Neck Lymph Nodes] : normal neck lymph nodes  [Normal Supraclavicular Lymph Nodes] : normal supraclavicular lymph nodes [Normal Axillary Lymph Nodes] : normal axillary lymph nodes [Normal] : oriented to person, place and time, with appropriate affect [de-identified] : healed right breast incision with no evidence of local recurrence; left breast WNL [de-identified] : healing right axillary incision with no infection or seroma

## 2022-08-04 NOTE — ASSESSMENT
[FreeTextEntry1] : IMP: \par 76 year female h/o lumpectomy s/p tamoxifen and XRT 20yr ago in right breast,  present for new right breast cancer \par s/p right breast lumpectomy w/SLNB on 4/20/22, path - 9mm IDC with 2mm DCIS, margins negative, SLNB negative, ER/MI+, her2 neg\par ELZA on Arimidex 1 mg daily with Dr. Prescott \par \par \par PLAN: \par -Right MMG/US in November, 2022\par -RTO q3 months \par -Continued follow-up  with Dr. Prescott

## 2022-08-04 NOTE — REASON FOR VISIT
[Breast Cancer] : breast cancer [Post-Op] : a post-op for [Spouse] : spouse [FreeTextEntry2] : right breast lumpectomy 4/20/2022

## 2022-08-15 ENCOUNTER — OUTPATIENT (OUTPATIENT)
Dept: OUTPATIENT SERVICES | Facility: HOSPITAL | Age: 77
LOS: 1 days | Discharge: HOME | End: 2022-08-15

## 2022-08-15 DIAGNOSIS — Z12.39 ENCOUNTER FOR OTHER SCREENING FOR MALIGNANT NEOPLASM OF BREAST: ICD-10-CM

## 2022-08-15 DIAGNOSIS — Z98.890 OTHER SPECIFIED POSTPROCEDURAL STATES: Chronic | ICD-10-CM

## 2022-08-15 DIAGNOSIS — M12.9 ARTHROPATHY, UNSPECIFIED: Chronic | ICD-10-CM

## 2022-08-18 ENCOUNTER — RESULT REVIEW (OUTPATIENT)
Age: 77
End: 2022-08-18

## 2022-08-18 ENCOUNTER — APPOINTMENT (OUTPATIENT)
Dept: HEMATOLOGY ONCOLOGY | Facility: CLINIC | Age: 77
End: 2022-08-18

## 2022-08-18 VITALS
WEIGHT: 155.4 LBS | TEMPERATURE: 206.78 F | BODY MASS INDEX: 30.51 KG/M2 | DIASTOLIC BLOOD PRESSURE: 80 MMHG | HEIGHT: 60 IN | HEART RATE: 62 BPM | OXYGEN SATURATION: 97 % | RESPIRATION RATE: 16 BRPM | SYSTOLIC BLOOD PRESSURE: 147 MMHG

## 2022-08-18 DIAGNOSIS — Z78.0 OTHER SPECIFIED DISORDERS OF BONE DENSITY AND STRUCTURE, UNSPECIFIED SITE: ICD-10-CM

## 2022-08-18 DIAGNOSIS — M85.80 OTHER SPECIFIED DISORDERS OF BONE DENSITY AND STRUCTURE, UNSPECIFIED SITE: ICD-10-CM

## 2022-08-18 LAB
ALBUMIN SERPL ELPH-MCNC: 4.7 G/DL
ALP BLD-CCNC: 83 U/L
ALT SERPL-CCNC: 14 U/L
ANION GAP SERPL CALC-SCNC: 13 MMOL/L
AST SERPL-CCNC: 18 U/L
BASOPHILS # BLD AUTO: 0.05 K/UL — SIGNIFICANT CHANGE UP (ref 0–0.2)
BASOPHILS NFR BLD AUTO: 0.8 % — SIGNIFICANT CHANGE UP (ref 0–2)
BILIRUB SERPL-MCNC: 0.3 MG/DL
BUN SERPL-MCNC: 14 MG/DL
CALCIUM SERPL-MCNC: 10.2 MG/DL
CHLORIDE SERPL-SCNC: 100 MMOL/L
CO2 SERPL-SCNC: 27 MMOL/L
CREAT SERPL-MCNC: 0.84 MG/DL
EGFR: 73 ML/MIN/1.73M2
EOSINOPHIL # BLD AUTO: 0.09 K/UL — SIGNIFICANT CHANGE UP (ref 0–0.5)
EOSINOPHIL NFR BLD AUTO: 1.5 % — SIGNIFICANT CHANGE UP (ref 0–6)
GLUCOSE SERPL-MCNC: 90 MG/DL
HCT VFR BLD CALC: 39.1 % — SIGNIFICANT CHANGE UP (ref 34.5–45)
HGB BLD-MCNC: 13.2 G/DL — SIGNIFICANT CHANGE UP (ref 11.5–15.5)
IMM GRANULOCYTES NFR BLD AUTO: 0.2 % — SIGNIFICANT CHANGE UP (ref 0–1.5)
LYMPHOCYTES # BLD AUTO: 1.73 K/UL — SIGNIFICANT CHANGE UP (ref 1–3.3)
LYMPHOCYTES # BLD AUTO: 29.3 % — SIGNIFICANT CHANGE UP (ref 13–44)
MCHC RBC-ENTMCNC: 30.6 PG — SIGNIFICANT CHANGE UP (ref 27–34)
MCHC RBC-ENTMCNC: 33.8 G/DL — SIGNIFICANT CHANGE UP (ref 32–36)
MCV RBC AUTO: 90.7 FL — SIGNIFICANT CHANGE UP (ref 80–100)
MONOCYTES # BLD AUTO: 0.43 K/UL — SIGNIFICANT CHANGE UP (ref 0–0.9)
MONOCYTES NFR BLD AUTO: 7.3 % — SIGNIFICANT CHANGE UP (ref 2–14)
NEUTROPHILS # BLD AUTO: 3.59 K/UL — SIGNIFICANT CHANGE UP (ref 1.8–7.4)
NEUTROPHILS NFR BLD AUTO: 60.9 % — SIGNIFICANT CHANGE UP (ref 43–77)
NRBC # BLD: 0 /100 WBCS — SIGNIFICANT CHANGE UP (ref 0–0)
PLATELET # BLD AUTO: 181 K/UL — SIGNIFICANT CHANGE UP (ref 150–400)
POTASSIUM SERPL-SCNC: 4.5 MMOL/L
PROT SERPL-MCNC: 7.6 G/DL
RBC # BLD: 4.31 M/UL — SIGNIFICANT CHANGE UP (ref 3.8–5.2)
RBC # FLD: 12.8 % — SIGNIFICANT CHANGE UP (ref 10.3–14.5)
SODIUM SERPL-SCNC: 140 MMOL/L
WBC # BLD: 5.9 K/UL — SIGNIFICANT CHANGE UP (ref 3.8–10.5)
WBC # FLD AUTO: 5.9 K/UL — SIGNIFICANT CHANGE UP (ref 3.8–10.5)

## 2022-08-18 PROCEDURE — 99214 OFFICE O/P EST MOD 30 MIN: CPT

## 2022-08-18 NOTE — HISTORY OF PRESENT ILLNESS
[de-identified] : ARNAUD NASH  is a 76 year old female here for initial consultation for management of right breast cancer.\par  \par In 2001, she had a right breast lumpectomy and axillary dissection with Dr. Jolly, followed by right breast radiation therapy. Pathology from [facility] showed a 0.2 cm moderately differentiated invasive breast cancer with extensive intraductal component of high grade comedo type DCIS. The invasive tumor was ER 80%, NY 30% and HER2 positive. One right sentinel lymph node was negative for tumor. She received adjuvant RT, reportedly for 6 weeks, followed by 2 years of tamoxifen and 3 years Arimidex by Dr. Marcus Conde.  \par \par She continued regular mammography and was without evidence of disease until recently. \par Recently, a diagnostic mammogram showed an irregularly marginated mass in the upper outer quadrant of the right breast measuring 11mm with associated microcalcifications. Ultrasound showed a solid mass with irregular margins measuring 11 x 4 x 8 mm at 9:00, 4 cm from the nipple. At 10:00, there was a post-surgical scar. There was no evidence of axillary lymphadenopathy. \par \par Ultrasound guided core biopsy of the right breast mass at 9:00 on 3/22/22 revealed invasive moderately to poorly differentiated ductal carcinoma with focal micropapillary features, Pretty score 7/9. Invasive tumor measured at least 0.4 cm. There was no DCIS and no lymphvascular invasion. The tumor was ER positive > 95%, NY positive >90%, HER2 negative. \par \par She underwent right lumpectomy with SLNB on 22. Pathology showed invasive moderately differentiated ductal carcinoma with micropapillary features, measuring 9 mm. DCIS was present with intermediate nuclear grade, measuring up to 2 mm in largest dimension, present in 2 out of 9 blocks. Margins were negative, measuring at least 5 mm for both the invasive and intraductal components. Right axillary sentinel LN excision showed skeletal muscle and fibroadipose tissue, with no lymphoid tissue present. \par \par Oncotype Dx score 14. \par \par Genetics 3/31/22 [employer] genetics negative for 8 deleterious genes.  Paternal cousin breast cancer age 60's. Paternal cousin's daughter brain cancer age 40. Brain cancer in brother age 60. Other brother with likely BCC.  Limited family history due to immigration here. Of note,  was diagnosed with breast cancer. \par \par Risk factors: Prior breast disease: Menarche: 16. Postmenopausal, menses regular. . Age of first pregnancy 26. Breast feeding: no\par Oral contraceptive use: no. Other hormone exposure: None. The patient is not of Ashkenazi ethnic background. \par \par  [de-identified] : 8/18/2022\par Patient presents today to rule out metastatic breast cancer and assess treatment toxicity.\par Started anastrozole on 6/3/2022 - tolerating very well\par Leaving for [place] ([place] - until October 27, 2022. \par Patient denies any hotflashes, arthralgias, vaginal dryness, vaginal bleeding, hair loss, muscle cramps.\par Patient denies any SOB, CP, abdominal pain, bone pain, headache, or unexplained weight loss\par Patient denies any breast masses, breast tenderness, skin changes or nipple discharge.\par Dr. Snell: breast surgeon next visit 11/2022;  \par \par Genetics: Ambry 8 gene panel - negative\par \par HEALTH MAINTENANCE \par PCP Dr. Rollins\par OPHTHO glaucoma\par BMD 3/15/22 osteopenia -1.7, on vit D\par GI 2 years ago ,up to date with colonoscopy,h/o polyps\par GYN pap smear 3 years ago,no vaginal bleeding or spotting\par s/p COVID vaccine and booster left arm

## 2022-08-18 NOTE — PHYSICAL EXAM
[Fully active, able to carry on all pre-disease performance without restriction] : Status 0 - Fully active, able to carry on all pre-disease performance without restriction [Normal] : affect appropriate [de-identified] : right lumpectomy healing well;retraction of breast RLQ at surgical incision site; No palpable masses b/l, no chest wall changes, no skin or nipple changes noted; no axillary adenopathy;  left breast No palpable masses  no chest wall changes, no skin or nipple changes noted; no axillary adenopathy

## 2022-08-18 NOTE — ASSESSMENT
[FreeTextEntry1] : ARNAUD NASH  is a 76 year old female with pmh of right breast moderately differentiated invasive breast cancer in 9/2001, pT1aN0,  ER 80%, VA 30% and HER2 positive, s/p right breast lumpectomy and axillary dissection, s/p RT and 5 years of endocrine therapy.  Pt dx 2022 w/ new primary right breast invasive moderately to poorly differentiated ductal carcinoma, pT1bNx, ER positive > 95%, VA positive >90%, HER2 negative. Oncotype Dx score 14. \par \par - ELZA x 3 months\par - started anastrozole on 6/3/2022 - tolerating very well\par - baseline bloodwork today\par - BMD Osteopenia T -1.4 3/2022 - Next due 3/2024\par - Recommend to continue to follow up with Dr. Snell and Dr. Mas as recommended.\par - up to date on imaging\par - FU in 4 - 5 months\par \par

## 2022-09-23 NOTE — H&P PST ADULT - BRAND OF COVID-19 VACCINATION
Medication management Booster 11-13-21/Pfizer dose 1, 2, and 3 Medication management Medication management

## 2022-09-27 ENCOUNTER — APPOINTMENT (OUTPATIENT)
Dept: HEMATOLOGY ONCOLOGY | Facility: CLINIC | Age: 77
End: 2022-09-27

## 2022-09-29 NOTE — ASSESSMENT
[FreeTextEntry1] : ARNAUD NASH  is a 76 year old female with pmh of right breast moderately differentiated invasive breast cancer in 9/2001, pT1aN0,  ER 80%, AL 30% and HER2 positive, s/p right breast lumpectomy and axillary dissection, s/p RT and 5 years of endocrine therapy.  Pt dx 2022 w/ new primary right breast invasive moderately to poorly differentiated ductal carcinoma, pT1bNx, ER positive > 95%, AL positive >90%, HER2 negative. Oncotype Dx score 14. \par \par - ELZA x 3 months\par - started anastrozole on 6/3/2022 - tolerating very well\par - baseline bloodwork today\par - BMD Osteopenia T -1.4 3/2022 - Next due 3/2024\par - Recommend to continue to follow up with Dr. Snell and Dr. Mas as recommended.\par - up to date on imaging\par - FU in 4 - 5 months\par \par

## 2022-09-29 NOTE — PHYSICAL EXAM
[Fully active, able to carry on all pre-disease performance without restriction] : Status 0 - Fully active, able to carry on all pre-disease performance without restriction [Normal] : affect appropriate [de-identified] : right lumpectomy healing well;retraction of breast RLQ at surgical incision site; No palpable masses b/l, no chest wall changes, no skin or nipple changes noted; no axillary adenopathy;  left breast No palpable masses  no chest wall changes, no skin or nipple changes noted; no axillary adenopathy

## 2022-09-29 NOTE — HISTORY OF PRESENT ILLNESS
[de-identified] : ARNAUD NASH  is a 76 year old female here for initial consultation for management of right breast cancer.\par  \par In 2001, she had a right breast lumpectomy and axillary dissection with Dr. Jolly, followed by right breast radiation therapy. Pathology from [facility] showed a 0.2 cm moderately differentiated invasive breast cancer with extensive intraductal component of high grade comedo type DCIS. The invasive tumor was ER 80%, MI 30% and HER2 positive. One right sentinel lymph node was negative for tumor. She received adjuvant RT, reportedly for 6 weeks, followed by 2 years of tamoxifen and 3 years Arimidex by Dr. Marcus Conde.  \par \par She continued regular mammography and was without evidence of disease until recently. \par Recently, a diagnostic mammogram showed an irregularly marginated mass in the upper outer quadrant of the right breast measuring 11mm with associated microcalcifications. Ultrasound showed a solid mass with irregular margins measuring 11 x 4 x 8 mm at 9:00, 4 cm from the nipple. At 10:00, there was a post-surgical scar. There was no evidence of axillary lymphadenopathy. \par \par Ultrasound guided core biopsy of the right breast mass at 9:00 on 3/22/22 revealed invasive moderately to poorly differentiated ductal carcinoma with focal micropapillary features, Pretty score 7/9. Invasive tumor measured at least 0.4 cm. There was no DCIS and no lymphvascular invasion. The tumor was ER positive > 95%, MI positive >90%, HER2 negative. \par \par She underwent right lumpectomy with SLNB on 22. Pathology showed invasive moderately differentiated ductal carcinoma with micropapillary features, measuring 9 mm. DCIS was present with intermediate nuclear grade, measuring up to 2 mm in largest dimension, present in 2 out of 9 blocks. Margins were negative, measuring at least 5 mm for both the invasive and intraductal components. Right axillary sentinel LN excision showed skeletal muscle and fibroadipose tissue, with no lymphoid tissue present. \par \par Oncotype Dx score 14. \par \par Genetics 3/31/22 [employer] genetics negative for 8 deleterious genes.  Paternal cousin breast cancer age 60's. Paternal cousin's daughter brain cancer age 40. Brain cancer in brother age 60. Other brother with likely BCC.  Limited family history due to immigration here. Of note,  was diagnosed with breast cancer. \par \par Risk factors: Prior breast disease: Menarche: 16. Postmenopausal, menses regular. . Age of first pregnancy 26. Breast feeding: no\par Oral contraceptive use: no. Other hormone exposure: None. The patient is not of Ashkenazi ethnic background. \par \par  [de-identified] : 8/18/2022\par Patient presents today to rule out metastatic breast cancer and assess treatment toxicity.\par Started anastrozole on 6/3/2022 - tolerating very well\par Leaving for [place] ([place]) - until October 27, 2022. \par Patient denies any hotflashes, arthralgias, vaginal dryness, vaginal bleeding, hair loss, muscle cramps.\par Patient denies any SOB, CP, abdominal pain, bone pain, headache, or unexplained weight loss\par Patient denies any breast masses, breast tenderness, skin changes or nipple discharge.\par Dr. Snell: breast surgeon next visit 11/2022;  \par \par Genetics: Ambry 8 gene panel - negative\par \par HEALTH MAINTENANCE \par PCP Dr. Rollins\par OPHTHO glaucoma\par BMD 3/15/22 osteopenia -1.7, on vit D\par GI 2 years ago ,up to date with colonoscopy,h/o polyps\par GYN pap smear 3 years ago,no vaginal bleeding or spotting\par s/p COVID vaccine and booster left arm

## 2022-11-02 ENCOUNTER — APPOINTMENT (OUTPATIENT)
Dept: MAMMOGRAPHY | Facility: CLINIC | Age: 77
End: 2022-11-02

## 2022-11-02 ENCOUNTER — RESULT REVIEW (OUTPATIENT)
Age: 77
End: 2022-11-02

## 2022-11-02 ENCOUNTER — APPOINTMENT (OUTPATIENT)
Dept: ULTRASOUND IMAGING | Facility: CLINIC | Age: 77
End: 2022-11-02

## 2022-11-02 PROCEDURE — 76641 ULTRASOUND BREAST COMPLETE: CPT | Mod: RT

## 2022-11-02 PROCEDURE — G0279: CPT | Mod: RT

## 2022-11-02 PROCEDURE — 77065 DX MAMMO INCL CAD UNI: CPT | Mod: RT

## 2022-11-07 ENCOUNTER — APPOINTMENT (OUTPATIENT)
Dept: SURGICAL ONCOLOGY | Facility: CLINIC | Age: 77
End: 2022-11-07

## 2022-11-07 VITALS
RESPIRATION RATE: 16 BRPM | DIASTOLIC BLOOD PRESSURE: 85 MMHG | OXYGEN SATURATION: 99 % | HEIGHT: 60 IN | BODY MASS INDEX: 30.04 KG/M2 | SYSTOLIC BLOOD PRESSURE: 143 MMHG | WEIGHT: 153 LBS | HEART RATE: 69 BPM

## 2022-11-07 PROCEDURE — 99214 OFFICE O/P EST MOD 30 MIN: CPT

## 2022-11-07 NOTE — ASSESSMENT
[FreeTextEntry1] : IMP: \par HX of lumpectomy s/p tamoxifen and XRT 20yr ago in right breast\par \par now s/p right breast lumpectomy w/ SLNB on 4/20/22, path: Invasive ductal carcinoma, 9 mm, moderately differentiated, DCIS, 2 mm, margins negative, ER+/RI+/HER2-, pT1b Nx\par Oncotype 14\par \par ELZA on Arimidex 1 mg daily with Dr. Segovia \par \par F/U right breast mammo/sono 11/2022- BIRADS 2\par \par PLAN: \par RTO Q3 months\par labs w/ Dr. Segovia \par

## 2022-11-07 NOTE — PHYSICAL EXAM
[Normal] : supple, no neck mass and thyroid not enlarged [Normal Neck Lymph Nodes] : normal neck lymph nodes  [Normal Axillary Lymph Nodes] : normal axillary lymph nodes [Normal Supraclavicular Lymph Nodes] : normal supraclavicular lymph nodes [Normal] : oriented to person, place and time, with appropriate affect [de-identified] : healed right breast incision with no evidence of local recurrence; left breast WNL [de-identified] : healing right axillary incision with no infection or seroma

## 2022-11-07 NOTE — HISTORY OF PRESENT ILLNESS
[de-identified] : Ms. ARNAUD NASH is a 76 year old female who presents today for a follow up visit. \par \par \par HX: right breast lumpectomy and axillary dissection with radiation and 5 years of Tamoxifen for a right breast cancer 20 years ago. Family History: Brain cancer in brother BRCA negative  \par \par She is s/p right breast lumpectomy with SLNB on 4/20/22, path:\par Invasive ductal carcinoma, 9 mm, moderately differentiated, DCIS, 2 mm, margins negative, ER+/UT+/HER2-, pT1b Nx\par \par Oncotype 14.\par \par Patient currently taking anastrozole under the care of  Dr. Segovia (previously Dr. Prescott)\par \par F/U right breast mammo/sono 11/2022- BIRADS 2

## 2022-12-27 ENCOUNTER — APPOINTMENT (OUTPATIENT)
Dept: INTERNAL MEDICINE | Facility: CLINIC | Age: 77
End: 2022-12-27
Payer: MEDICARE

## 2022-12-27 ENCOUNTER — MED ADMIN CHARGE (OUTPATIENT)
Age: 77
End: 2022-12-27

## 2022-12-27 VITALS
HEART RATE: 64 BPM | RESPIRATION RATE: 12 BRPM | TEMPERATURE: 206.6 F | HEIGHT: 60 IN | SYSTOLIC BLOOD PRESSURE: 153 MMHG | BODY MASS INDEX: 31.41 KG/M2 | WEIGHT: 160 LBS | OXYGEN SATURATION: 100 % | DIASTOLIC BLOOD PRESSURE: 75 MMHG

## 2022-12-27 VITALS — DIASTOLIC BLOOD PRESSURE: 72 MMHG | SYSTOLIC BLOOD PRESSURE: 120 MMHG

## 2022-12-27 PROCEDURE — 90662 IIV NO PRSV INCREASED AG IM: CPT

## 2022-12-27 PROCEDURE — 99213 OFFICE O/P EST LOW 20 MIN: CPT | Mod: 25

## 2022-12-27 PROCEDURE — G0008: CPT

## 2023-01-09 NOTE — PHYSICAL EXAM
[No Acute Distress] : no acute distress [Well Nourished] : well nourished [Normal Sclera/Conjunctiva] : normal sclera/conjunctiva [EOMI] : extraocular movements intact [Normal Outer Ear/Nose] : the outer ears and nose were normal in appearance [Normal Oropharynx] : the oropharynx was normal [No JVD] : no jugular venous distention [No Lymphadenopathy] : no lymphadenopathy [Supple] : supple [Thyroid Normal, No Nodules] : the thyroid was normal and there were no nodules present [No Respiratory Distress] : no respiratory distress  [No Accessory Muscle Use] : no accessory muscle use [Clear to Auscultation] : lungs were clear to auscultation bilaterally [Normal Rate] : normal rate  [Regular Rhythm] : with a regular rhythm [Normal S1, S2] : normal S1 and S2 [No Murmur] : no murmur heard [No Carotid Bruits] : no carotid bruits [Pedal Pulses Present] : the pedal pulses are present [No Edema] : there was no peripheral edema [No Extremity Clubbing/Cyanosis] : no extremity clubbing/cyanosis [Soft] : abdomen soft [Non Tender] : non-tender [Non-distended] : non-distended [Normal Bowel Sounds] : normal bowel sounds [Normal Posterior Cervical Nodes] : no posterior cervical lymphadenopathy [Normal Anterior Cervical Nodes] : no anterior cervical lymphadenopathy [No CVA Tenderness] : no CVA  tenderness [No Spinal Tenderness] : no spinal tenderness [No Joint Swelling] : no joint swelling [Grossly Normal Strength/Tone] : grossly normal strength/tone [No Rash] : no rash [No Focal Deficits] : no focal deficits [Normal Gait] : normal gait [Normal Affect] : the affect was normal [Normal Insight/Judgement] : insight and judgment were intact [de-identified] : +varicose vein

## 2023-01-09 NOTE — HISTORY OF PRESENT ILLNESS
[de-identified] : 76 year old female with history of Right breast cancer s/p lumpectomy and radiation 2001 presents for follow-up \par \par She feels well \par Denies any CP, SOB, HA, N/V or abdominal pain \par Offers no complaints \par \par She continues to follow with oncology every 3 months and  currently on Anastrozole

## 2023-01-09 NOTE — PLAN
[FreeTextEntry1] : Follow-up \par \par Flu vaccine today \par \par Breast cancer \par Following with Oncology every 3  months\par cont Anastrozole 1 mg daily \par \par Weight management, Healthy food choices, and increased physical active discussed \par \par Will make appt for full physical after 1/6/2023

## 2023-01-19 ENCOUNTER — APPOINTMENT (OUTPATIENT)
Dept: INTERNAL MEDICINE | Facility: CLINIC | Age: 78
End: 2023-01-19
Payer: MEDICARE

## 2023-01-19 VITALS
HEART RATE: 76 BPM | OXYGEN SATURATION: 98 % | WEIGHT: 158 LBS | BODY MASS INDEX: 31.02 KG/M2 | SYSTOLIC BLOOD PRESSURE: 138 MMHG | HEIGHT: 60 IN | RESPIRATION RATE: 12 BRPM | TEMPERATURE: 208.22 F | DIASTOLIC BLOOD PRESSURE: 65 MMHG

## 2023-01-19 PROCEDURE — 99397 PER PM REEVAL EST PAT 65+ YR: CPT

## 2023-01-19 NOTE — HEALTH RISK ASSESSMENT
[Good] : ~his/her~  mood as  good [Former] : Former [No] : No [No falls in past year] : Patient reported no falls in the past year [0] : 2) Feeling down, depressed, or hopeless: Not at all (0) [PHQ-2 Negative - No further assessment needed] : PHQ-2 Negative - No further assessment needed [Patient reported PAP Smear was normal] : Patient reported PAP Smear was normal [Patient reported colonoscopy was normal] : Patient reported colonoscopy was normal [Feels Safe at Home] : Feels safe at home [Fully functional (bathing, dressing, toileting, transferring, walking, feeding)] : Fully functional (bathing, dressing, toileting, transferring, walking, feeding) [Reports normal functional visual acuity (ie: able to read med bottle)] : Reports normal functional visual acuity [Smoke Detector] : smoke detector [Carbon Monoxide Detector] : carbon monoxide detector [Safety elements used in home] : safety elements used in home [Seat Belt] :  uses seat belt [Sunscreen] : uses sunscreen [Reports changes in hearing] : Reports no changes in hearing [Reports changes in vision] : Reports no changes in vision [Reports changes in dental health] : Reports no changes in dental health [Guns at Home] : no guns at home [Travel to Developing Areas] : does not  travel to developing areas [TB Exposure] : is not being exposed to tuberculosis [Caregiver Concerns] : does not have caregiver concerns [MammogramDate] : 11/2022 [MammogramComments] : Goes every 6 months h/o Breast Cancer  [PapSmearDate] : 3-4 years ago  [BoneDensityDate] : 3/2022 [BoneDensityComments] : Osteopenia  [ColonoscopyDate] : 3 years ago

## 2023-01-19 NOTE — PLAN
[FreeTextEntry1] : Physical \par \par UTD with screenings \par declined pneumonia and shingle vaccine \par \par Breast cancer \par Following with Oncology every 3  months\par cont Anastrozole 1 mg daily\par will monitor tumor markers  \par \par Weight management, Healthy food choices, and increased physical active discussed \par \par Labs ordered pt to follow-up in 1 week for results

## 2023-01-19 NOTE — PHYSICAL EXAM
[No Acute Distress] : no acute distress [Well Nourished] : well nourished [Normal Sclera/Conjunctiva] : normal sclera/conjunctiva [EOMI] : extraocular movements intact [Normal Outer Ear/Nose] : the outer ears and nose were normal in appearance [Normal Oropharynx] : the oropharynx was normal [No JVD] : no jugular venous distention [No Lymphadenopathy] : no lymphadenopathy [Supple] : supple [Thyroid Normal, No Nodules] : the thyroid was normal and there were no nodules present [No Respiratory Distress] : no respiratory distress  [No Accessory Muscle Use] : no accessory muscle use [Clear to Auscultation] : lungs were clear to auscultation bilaterally [Normal Rate] : normal rate  [Regular Rhythm] : with a regular rhythm [Normal S1, S2] : normal S1 and S2 [No Murmur] : no murmur heard [No Carotid Bruits] : no carotid bruits [Pedal Pulses Present] : the pedal pulses are present [No Edema] : there was no peripheral edema [No Extremity Clubbing/Cyanosis] : no extremity clubbing/cyanosis [Soft] : abdomen soft [Non Tender] : non-tender [Non-distended] : non-distended [Normal Bowel Sounds] : normal bowel sounds [Normal Posterior Cervical Nodes] : no posterior cervical lymphadenopathy [Normal Anterior Cervical Nodes] : no anterior cervical lymphadenopathy [No CVA Tenderness] : no CVA  tenderness [No Spinal Tenderness] : no spinal tenderness [No Joint Swelling] : no joint swelling [Grossly Normal Strength/Tone] : grossly normal strength/tone [No Rash] : no rash [Coordination Grossly Intact] : coordination grossly intact [No Focal Deficits] : no focal deficits [Normal Gait] : normal gait [Normal Affect] : the affect was normal [Normal Insight/Judgement] : insight and judgment were intact [de-identified] : +varicose vein

## 2023-01-19 NOTE — HISTORY OF PRESENT ILLNESS
[de-identified] : 77 year old female with history of Right breast cancer s/p lumpectomy and radiation 2001 presents for physical \par \par She feels well . She walks daily \par Denies any CP, SOB, HA, N/V or abdominal pain \par Offers no complaints \par \par She continues to follow with oncology every 3 months and   currently on Anastrozole

## 2023-01-26 ENCOUNTER — OUTPATIENT (OUTPATIENT)
Dept: OUTPATIENT SERVICES | Facility: HOSPITAL | Age: 78
LOS: 1 days | Discharge: HOME | End: 2023-01-26

## 2023-01-26 DIAGNOSIS — Z12.39 ENCOUNTER FOR OTHER SCREENING FOR MALIGNANT NEOPLASM OF BREAST: ICD-10-CM

## 2023-01-26 DIAGNOSIS — M12.9 ARTHROPATHY, UNSPECIFIED: Chronic | ICD-10-CM

## 2023-01-26 DIAGNOSIS — Z98.890 OTHER SPECIFIED POSTPROCEDURAL STATES: Chronic | ICD-10-CM

## 2023-01-31 ENCOUNTER — NON-APPOINTMENT (OUTPATIENT)
Age: 78
End: 2023-01-31

## 2023-02-02 ENCOUNTER — APPOINTMENT (OUTPATIENT)
Dept: HEMATOLOGY ONCOLOGY | Facility: CLINIC | Age: 78
End: 2023-02-02
Payer: MEDICARE

## 2023-02-02 VITALS
WEIGHT: 158.07 LBS | HEART RATE: 64 BPM | DIASTOLIC BLOOD PRESSURE: 70 MMHG | OXYGEN SATURATION: 99 % | TEMPERATURE: 97.3 F | SYSTOLIC BLOOD PRESSURE: 151 MMHG | RESPIRATION RATE: 16 BRPM | BODY MASS INDEX: 30.87 KG/M2

## 2023-02-02 PROCEDURE — 99214 OFFICE O/P EST MOD 30 MIN: CPT

## 2023-02-03 LAB
25(OH)D3 SERPL-MCNC: 74 NG/ML
ALBUMIN SERPL ELPH-MCNC: 4.2 G/DL
ALP BLD-CCNC: 90 U/L
ALT SERPL-CCNC: 16 U/L
ANION GAP SERPL CALC-SCNC: 12 MMOL/L
APPEARANCE: CLEAR
AST SERPL-CCNC: 22 U/L
BACTERIA: NEGATIVE
BASOPHILS # BLD AUTO: 0.03 K/UL
BASOPHILS NFR BLD AUTO: 0.6 %
BILIRUB SERPL-MCNC: 0.5 MG/DL
BILIRUBIN URINE: NEGATIVE
BLOOD URINE: NEGATIVE
BUN SERPL-MCNC: 11 MG/DL
CALCIUM SERPL-MCNC: 9.9 MG/DL
CANCER AG15-3 SERPL-ACNC: 17.5 U/ML
CANCER AG27-29 SERPL-ACNC: 21.9 U/ML
CEA SERPL-MCNC: 3.5 NG/ML
CHLORIDE SERPL-SCNC: 100 MMOL/L
CHOLEST SERPL-MCNC: 199 MG/DL
CO2 SERPL-SCNC: 27 MMOL/L
COLOR: NORMAL
COVID-19 NUCLEOCAPSID  GAM ANTIBODY INTERPRETATION: NEGATIVE
COVID-19 SPIKE DOMAIN ANTIBODY INTERPRETATION: POSITIVE
CREAT SERPL-MCNC: 0.71 MG/DL
EGFR: 89 ML/MIN/1.73M2
EOSINOPHIL # BLD AUTO: 0.1 K/UL
EOSINOPHIL NFR BLD AUTO: 1.9 %
ESTIMATED AVERAGE GLUCOSE: 111 MG/DL
GLUCOSE QUALITATIVE U: NEGATIVE
GLUCOSE SERPL-MCNC: 92 MG/DL
HBA1C MFR BLD HPLC: 5.5 %
HCT VFR BLD CALC: 41 %
HDLC SERPL-MCNC: 93 MG/DL
HGB BLD-MCNC: 13.3 G/DL
HYALINE CASTS: 0 /LPF
IMM GRANULOCYTES NFR BLD AUTO: 0 %
KETONES URINE: NEGATIVE
LDLC SERPL CALC-MCNC: 91 MG/DL
LEUKOCYTE ESTERASE URINE: NEGATIVE
LYMPHOCYTES # BLD AUTO: 1.51 K/UL
LYMPHOCYTES NFR BLD AUTO: 29.3 %
MAN DIFF?: NORMAL
MCHC RBC-ENTMCNC: 30.1 PG
MCHC RBC-ENTMCNC: 32.4 GM/DL
MCV RBC AUTO: 92.8 FL
MICROSCOPIC-UA: NORMAL
MONOCYTES # BLD AUTO: 0.37 K/UL
MONOCYTES NFR BLD AUTO: 7.2 %
NEUTROPHILS # BLD AUTO: 3.14 K/UL
NEUTROPHILS NFR BLD AUTO: 61 %
NITRITE URINE: NEGATIVE
NONHDLC SERPL-MCNC: 106 MG/DL
PH URINE: 7
PLATELET # BLD AUTO: 204 K/UL
POTASSIUM SERPL-SCNC: 4.8 MMOL/L
PROT SERPL-MCNC: 7.8 G/DL
PROTEIN URINE: NEGATIVE
RBC # BLD: 4.42 M/UL
RBC # FLD: 12.9 %
RED BLOOD CELLS URINE: 1 /HPF
SARS-COV-2 AB SERPL IA-ACNC: >250 U/ML
SARS-COV-2 AB SERPL QL IA: 0.07 INDEX
SODIUM SERPL-SCNC: 139 MMOL/L
SPECIFIC GRAVITY URINE: 1.01
SQUAMOUS EPITHELIAL CELLS: 0 /HPF
TRIGL SERPL-MCNC: 77 MG/DL
TSH SERPL-ACNC: 2 UIU/ML
UROBILINOGEN URINE: NORMAL
VIT B12 SERPL-MCNC: 963 PG/ML
WBC # FLD AUTO: 5.15 K/UL
WHITE BLOOD CELLS URINE: 0 /HPF

## 2023-02-05 NOTE — PHYSICAL EXAM
[Fully active, able to carry on all pre-disease performance without restriction] : Status 0 - Fully active, able to carry on all pre-disease performance without restriction [Normal] : affect appropriate [de-identified] : right lumpectomy well healed;retraction of breast RLQ at surgical incision site; No palpable masses b/l, no chest wall changes, no skin or nipple changes noted; no axillary adenopathy;  left breast No palpable masses  no chest wall changes, no skin or nipple changes noted; no axillary adenopathy

## 2023-02-05 NOTE — ASSESSMENT
[FreeTextEntry1] : ARNAUD NASH  is a 77 year old female with pmh of right breast moderately differentiated invasive breast cancer in 9/2001, pT1aN0,  ER 80%, VT 30% and HER2 positive, s/p right breast lumpectomy and axillary dissection, s/p RT and 5 years of endocrine therapy.  Pt dx 2022 w/ new primary right breast invasive moderately to poorly differentiated ductal carcinoma, pT1bNx, ER positive > 95%, VT positive >90%, HER2 negative. Oncotype Dx score 14. \par \par - ELZA x 9 months\par - started anastrozole on 6/3/2022 - tolerating very well without side effects\par - BMD Osteopenia T -1.4 3/2022 - Next due 3/2024\par - Recommend to continue to follow up with Dr. Snell and Dr. Mas as recommended.\par - repeat mammo/sono annually in 3/2023 as recommended by radiology; BIRADS 2 on imaging in 11/2023\par - reviewed labs ordered by PMD; chem panel and lipid profile normal. Discussed with patient that there is no indication to routinely order tumor markers such as CEA, CA 27-29 and CA 15-3; these lab values are not screening tests and have no role in early stage breast cancer such as hers.\par - Patient had the opportunity to have all their questions answered to their satisfaction \par - FU in 6 months or sooner if needed\par \par

## 2023-02-05 NOTE — HISTORY OF PRESENT ILLNESS
[de-identified] : ARNAUD NASH  is a 76 year old female here for initial consultation for management of right breast cancer.\par  \par In 2001, she had a right breast lumpectomy and axillary dissection with Dr. Jolly, followed by right breast radiation therapy. Pathology from [facility] showed a 0.2 cm moderately differentiated invasive breast cancer with extensive intraductal component of high grade comedo type DCIS. The invasive tumor was ER 80%, LA 30% and HER2 positive. One right sentinel lymph node was negative for tumor. She received adjuvant RT, reportedly for 6 weeks, followed by 2 years of tamoxifen and 3 years Arimidex by Dr. Marcus Conde.  \par \par She continued regular mammography and was without evidence of disease until 3/2022\par On 3/15/22 diagnostic mammogram showed an irregularly marginated mass in the upper outer quadrant of the right breast measuring 11mm with associated microcalcifications. Ultrasound showed a solid mass with irregular margins measuring 11 x 4 x 8 mm at 9:00, 4 cm from the nipple. At 10:00, there was a post-surgical scar. There was no evidence of axillary lymphadenopathy. \par \par Ultrasound guided core biopsy of the right breast mass at 9:00 on 3/22/22 revealed invasive moderately to poorly differentiated ductal carcinoma with focal micropapillary features, Marion score 7/9. Invasive tumor measured at least 0.4 cm. There was no DCIS and no lymphvascular invasion. The tumor was ER positive > 95%, LA positive >90%, HER2 negative. \par \par She underwent right lumpectomy with SLNB on 22. Pathology showed invasive moderately differentiated ductal carcinoma with micropapillary features, measuring 9 mm. DCIS was present with intermediate nuclear grade, measuring up to 2 mm in largest dimension, present in 2 out of 9 blocks. Margins were negative, measuring at least 5 mm for both the invasive and intraductal components. Right axillary sentinel LN excision showed skeletal muscle and fibroadipose tissue, with no lymphoid tissue present.\par \par Oncotype Dx score 14. \par \par Genetics 3/31/22 [employer] genetics negative for 8 deleterious genes.  Paternal cousin breast cancer age 60's. Paternal cousin's daughter brain cancer age 40. Brain cancer in brother age 60. Other brother with likely BCC.  Limited family history due to immigration here. Of note,  was diagnosed with breast cancer and not tolerating treatments\par \par Risk factors: Prior breast disease: Menarche: 16. Postmenopausal, menses regular. . Age of first pregnancy 26. Breast feeding: no\par Oral contraceptive use: no. Other hormone exposure: None. The patient is not of Ashkenazi ethnic background. \par \par  [de-identified] : 2/2/23\par Transferring care from Dr. Prescott to me today\par All of the patient's prior records including radiology, pathology and prior notes reviewed; Past Medical History, Past Surgical History, Family History and Social history reviewed and updated in the patient's chart. \par \par Patient presents today to rule out progression/recurrence breast cancer and assess treatment toxicity.\par Started anastrozole on 6/3/2022 - tolerating very well\par Travels intermittently to [place] ([place])\par Patient denies any hotflashes, arthralgias, vaginal dryness, vaginal bleeding, hair loss, muscle cramps.\par Patient denies any SOB, CP, abdominal pain, bone pain, headache, or unexplained weight loss\par Patient denies any breast masses, breast tenderness, skin changes or nipple discharge.\par Dr. Snell: breast surgeon seen in 11/2022;  has follow-up in 3/2023\par \par Genetics: Ambry 8 gene panel - negative\par \par HEALTH MAINTENANCE \par PCP Dr. Rollins\par OPHTHO glaucoma\par BMD 3/15/22 osteopenia -1.7, on vit D\par GI 2 years ago ,up to date with colonoscopy,h/o polyps\par GYN pap smear 3 years ago,no vaginal bleeding or spotting\par s/p COVID vaccine and booster left arm [100: Normal, no complaints, no evidence of disease.] : 100: Normal, no complaints, no evidence of disease.

## 2023-03-27 ENCOUNTER — APPOINTMENT (OUTPATIENT)
Dept: SURGICAL ONCOLOGY | Facility: CLINIC | Age: 78
End: 2023-03-27
Payer: MEDICARE

## 2023-03-27 VITALS
BODY MASS INDEX: 30.82 KG/M2 | SYSTOLIC BLOOD PRESSURE: 157 MMHG | RESPIRATION RATE: 17 BRPM | HEIGHT: 60 IN | WEIGHT: 157 LBS | DIASTOLIC BLOOD PRESSURE: 91 MMHG | TEMPERATURE: 97.9 F | OXYGEN SATURATION: 97 % | HEART RATE: 79 BPM

## 2023-03-27 PROCEDURE — 99214 OFFICE O/P EST MOD 30 MIN: CPT

## 2023-03-27 NOTE — CONSULT LETTER
[Dear  ___] : Dear  [unfilled], [Courtesy Letter:] : I had the pleasure of seeing your patient, [unfilled], in my office today. [Please see my note below.] : Please see my note below. [Consult Closing:] : Thank you very much for allowing me to participate in the care of this patient.  If you have any questions, please do not hesitate to contact me. [Sincerely,] : Sincerely, [FreeTextEntry1] : I will keep you informed of the annual imaging  [FreeTextEntry3] : Magdi Snell MD FACS\par Chief of Surgical Oncology\par \par

## 2023-03-27 NOTE — HISTORY OF PRESENT ILLNESS
[de-identified] : Ms. ARNAUD NASH is a 77 year old female who presents today for a follow up visit. \par \par HX: right breast lumpectomy and axillary dissection with radiation and 5 years of Tamoxifen for a right breast cancer 20 years ago. Family History: Brain cancer in brother BRCA negative  \par \par She is s/p right breast lumpectomy with SLNB on 4/20/22, path:\par Invasive ductal carcinoma, 9 mm, moderately differentiated, DCIS, 2 mm, margins negative, ER+/DC+/HER2-, pT1b Nx\par \par Oncotype 14.\par \par Patient currently taking anastrozole under the care of  Dr. Negrete (previously Dr. Prescott)\par \par F/U right breast mammo/sono 11/2022- BIRADS 2

## 2023-03-27 NOTE — PHYSICAL EXAM
[Normal] : supple, no neck mass and thyroid not enlarged [Normal Neck Lymph Nodes] : normal neck lymph nodes  [Normal Supraclavicular Lymph Nodes] : normal supraclavicular lymph nodes [Normal Axillary Lymph Nodes] : normal axillary lymph nodes [Normal] : oriented to person, place and time, with appropriate affect [de-identified] : healed right breast incision with no evidence of local recurrence; left breast WNL

## 2023-03-27 NOTE — ASSESSMENT
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 Segovia \par \par F/U right breast mammo/sono 11/2022- BIRADS 2\par \par PLAN: \par RTO Q4 months\par labs w/ Dr. Negrete\par B/L mammo/sono 3/2023 \par

## 2023-04-17 ENCOUNTER — EMERGENCY (EMERGENCY)
Facility: HOSPITAL | Age: 78
LOS: 1 days | Discharge: HOME | End: 2023-04-17
Attending: EMERGENCY MEDICINE
Payer: MEDICARE

## 2023-04-17 VITALS
WEIGHT: 156.09 LBS | HEART RATE: 77 BPM | OXYGEN SATURATION: 97 % | RESPIRATION RATE: 20 BRPM | HEIGHT: 60 IN | SYSTOLIC BLOOD PRESSURE: 163 MMHG | DIASTOLIC BLOOD PRESSURE: 81 MMHG | TEMPERATURE: 98 F

## 2023-04-17 DIAGNOSIS — M12.9 ARTHROPATHY, UNSPECIFIED: Chronic | ICD-10-CM

## 2023-04-17 DIAGNOSIS — Z98.890 OTHER SPECIFIED POSTPROCEDURAL STATES: Chronic | ICD-10-CM

## 2023-04-17 PROCEDURE — 71250 CT THORAX DX C-: CPT | Mod: MA

## 2023-04-17 PROCEDURE — 71250 CT THORAX DX C-: CPT | Mod: 26,MA

## 2023-04-17 PROCEDURE — 99284 EMERGENCY DEPT VISIT MOD MDM: CPT

## 2023-04-17 PROCEDURE — 99284 EMERGENCY DEPT VISIT MOD MDM: CPT | Mod: 25

## 2023-04-17 RX ORDER — LIDOCAINE 4 G/100G
1 CREAM TOPICAL ONCE
Refills: 0 | Status: COMPLETED | OUTPATIENT
Start: 2023-04-17 | End: 2023-04-17

## 2023-04-17 RX ORDER — ACETAMINOPHEN 500 MG
975 TABLET ORAL ONCE
Refills: 0 | Status: COMPLETED | OUTPATIENT
Start: 2023-04-17 | End: 2023-04-17

## 2023-04-17 RX ADMIN — LIDOCAINE 1 PATCH: 4 CREAM TOPICAL at 17:12

## 2023-04-17 RX ADMIN — Medication 975 MILLIGRAM(S): at 17:12

## 2023-04-17 NOTE — ED PROVIDER NOTE - PATIENT PORTAL LINK FT
You can access the FollowMyHealth Patient Portal offered by [facility] by registering at the following website: [email or web address]. By joining [facility]’s FollowMyHealth portal, you will also be able to view your health information using other applications (apps) compatible with our system.

## 2023-04-17 NOTE — ED PROVIDER NOTE - MUSCULOSKELETAL, MLM
+Left lower thoracic paraspinal ttp. Posterior left lower rib ttp without obvious deformity. Spine appears normal with no midline ttp/deformity, range of motion is not limited, no joint tenderness. Ambulatory without difficulty

## 2023-04-17 NOTE — ED PROVIDER NOTE - NSFOLLOWUPINSTRUCTIONS_ED_ALL_ED_FT
Please follow up with your primary care doctor within 1 week.  *Bring all printed lab/test results to your appointment(s).*    Take ibuprofen 400-600mg every 6 hrs as needed for pain. Take with food  Take acetaminophen 500-1000mg every 6 hrs as needed for pain. DO NOT EXCEED 4000mg DAILY.    Return to the ED for worsening pain, shortness of breath, difficulty walking, dizziness, or any other concerns. Please follow up with your primary care doctor within 1 week.  *Bring all printed lab/test results to your appointment(s).*    Take ibuprofen 400-600mg every 6 hrs as needed for pain. Take with food  Take acetaminophen 500-1000mg every 6 hrs as needed for pain. DO NOT EXCEED 4000mg DAILY.  Apply topical lidocaine patch to affected area every 12 hrs as needed for pain.  (Please read all medication information/instructions).    Use incentive spirometer 10x per hour for the next 5 days. For example, if you are watching TV use it once every TV commercial break.     Return to the ED for worsening pain, shortness of breath, difficulty walking, dizziness, or any other concerns.

## 2023-04-17 NOTE — ED PROVIDER NOTE - CROS ED ROS STATEMENT
SHOULDER RANGE OF MOTION  AROM Flexion Abduction ER Base Ext/IR or hand behind back angle   Left 150 150  Ant Drift: na 60 T5   Right 145 130  Ant Drift: none 60 T8   Pain: yes      PROM Flexion Abd 90-90 ER 90-90 IR Scap Stabilized Horizontal Adduction   Left na na  na na 20   Right 125 na wnl na 20   GH indicates pure glenohumeral ROM    Exercise Sets x Reps Frequency Goal   Reverse flies  3 x week X 30   Rowing with arms near side of body   X 30   Triceps (not overhead)   X 20   External rotation   X 30   Internal rotation   X 30   Pendulum/circles on back   X 30   Bent over bowling    X 30   On back pull band apart to touch chest with arms straight   X 30      all other ROS negative except as per HPI

## 2023-04-17 NOTE — ED PROVIDER NOTE - OBJECTIVE STATEMENT
Pt is a 78yo female with a PMHx of right breast cancer, right knee replacement, glaucoma presenting to the ED with complaint of back pain s/p fall this morning. Pt states she was going down the steps at home when she slipped. Reports she was able to catch herself holding on with the right hand but her back hit the wooden steps. Localizes the pain to left mid back. Denies pain at rest, states it only comes on when getting up from bed and moving toward left side. Denies changes in pain with inspiration. Denies hitting head, LOC, dizziness, weakness, SOB, injuries or pain to any other area. not on AC

## 2023-04-17 NOTE — ED PROVIDER NOTE - ATTENDING CONTRIBUTION TO CARE
l flank / back pain after fall  no spinal ttp  no abdominal ttp  l flank / rib ttp  concern for rib fx  ct / endorsed to pm md

## 2023-04-17 NOTE — ED ADULT NURSE NOTE - OBJECTIVE STATEMENT
Pt is a 76yo female with a PMHx of right breast cancer, right knee replacement, glaucoma presenting to the ED with complaint of back pain s/p fall this morning. Pt states she was going down the steps at home when she slipped. Reports she was able to catch herself holding on with the right hand but her back hit the wooden steps. Localizes the pain to left mid back. Denies pain at rest, states it only comes on when getting up from bed and moving toward left side. Denies changes in pain with inspiration. Denies hitting head, LOC, dizziness, weakness, SOB, injuries or pain to any other area. not on AC

## 2023-04-17 NOTE — ED PROVIDER NOTE - PROGRESS NOTE DETAILS
acute nondisplaced posterior L 11th rib fx. Attending aware. Patient aware. discussed findings with pt and demonstrated how to use incentive spirometry. pain well controlled, ambulatory without difficulty. Will dc with follow up. Discussed plan and return precautions with patient who understands and agrees. All questions answered. - Rico Loredo PA-C

## 2023-04-26 ENCOUNTER — APPOINTMENT (OUTPATIENT)
Dept: INTERNAL MEDICINE | Facility: CLINIC | Age: 78
End: 2023-04-26
Payer: MEDICARE

## 2023-04-26 VITALS
RESPIRATION RATE: 16 BRPM | OXYGEN SATURATION: 100 % | DIASTOLIC BLOOD PRESSURE: 60 MMHG | BODY MASS INDEX: 30.63 KG/M2 | TEMPERATURE: 97.1 F | SYSTOLIC BLOOD PRESSURE: 119 MMHG | WEIGHT: 156 LBS | HEART RATE: 71 BPM | HEIGHT: 60 IN

## 2023-04-26 DIAGNOSIS — S22.39XA FRACTURE OF ONE RIB, UNSPECIFIED SIDE, INITIAL ENCOUNTER FOR CLOSED FRACTURE: ICD-10-CM

## 2023-04-26 DIAGNOSIS — C50.919 MALIGNANT NEOPLASM OF UNSPECIFIED SITE OF UNSPECIFIED FEMALE BREAST: ICD-10-CM

## 2023-04-26 PROCEDURE — 99214 OFFICE O/P EST MOD 30 MIN: CPT

## 2023-04-30 PROBLEM — C50.919 MALIGNANT NEOPLASM OF BREAST: Status: ACTIVE | Noted: 2018-06-19

## 2023-04-30 NOTE — HISTORY OF PRESENT ILLNESS
[de-identified] : 77 year old female with history of Right breast cancer s/p lumpectomy and radiation 2001 presents for follow-up \par \par Patient reports she slipped and fell down the steps and broke her ribs. She was evaluated at [facility] and had CT chest which showed fractured 11th left posterior rib  rib. She was discharged with incentive spirometer which she has been using allow with pain patch. She denies any chest pain, shortness of breath, cough, N/V or abdominal pain + increased pain when moving from lying to seated position.

## 2023-04-30 NOTE — PLAN
[FreeTextEntry1] : Follow-up \par \par Rib Fracture\par continued use of incentive spirometer \par cont Use of pain patch PRN \par \par \par Breast cancer \par Following with Oncology every 3  months\par cont Anastrozole 1 mg daily \par \par

## 2023-04-30 NOTE — PHYSICAL EXAM
[No Acute Distress] : no acute distress [Normal Sclera/Conjunctiva] : normal sclera/conjunctiva [EOMI] : extraocular movements intact [Normal Outer Ear/Nose] : the outer ears and nose were normal in appearance [Normal Oropharynx] : the oropharynx was normal [No JVD] : no jugular venous distention [No Lymphadenopathy] : no lymphadenopathy [Supple] : supple [Thyroid Normal, No Nodules] : the thyroid was normal and there were no nodules present [No Respiratory Distress] : no respiratory distress  [No Accessory Muscle Use] : no accessory muscle use [Clear to Auscultation] : lungs were clear to auscultation bilaterally [Normal Rate] : normal rate  [Regular Rhythm] : with a regular rhythm [Normal S1, S2] : normal S1 and S2 [No Murmur] : no murmur heard [No Carotid Bruits] : no carotid bruits [Pedal Pulses Present] : the pedal pulses are present [No Edema] : there was no peripheral edema [No Extremity Clubbing/Cyanosis] : no extremity clubbing/cyanosis [Soft] : abdomen soft [Non Tender] : non-tender [Non-distended] : non-distended [Normal Bowel Sounds] : normal bowel sounds [No CVA Tenderness] : no CVA  tenderness [No Spinal Tenderness] : no spinal tenderness [No Joint Swelling] : no joint swelling [Grossly Normal Strength/Tone] : grossly normal strength/tone [No Rash] : no rash [Coordination Grossly Intact] : coordination grossly intact [No Focal Deficits] : no focal deficits [Normal Gait] : normal gait [Normal Affect] : the affect was normal [Normal Insight/Judgement] : insight and judgment were intact [de-identified] : left chest wall tenderness [de-identified] : +varicose vein

## 2023-05-02 ENCOUNTER — APPOINTMENT (OUTPATIENT)
Dept: MAMMOGRAPHY | Facility: CLINIC | Age: 78
End: 2023-05-02

## 2023-05-05 ENCOUNTER — APPOINTMENT (OUTPATIENT)
Dept: MAMMOGRAPHY | Facility: CLINIC | Age: 78
End: 2023-05-05
Payer: MEDICARE

## 2023-05-05 ENCOUNTER — RESULT REVIEW (OUTPATIENT)
Age: 78
End: 2023-05-05

## 2023-05-05 ENCOUNTER — APPOINTMENT (OUTPATIENT)
Dept: ULTRASOUND IMAGING | Facility: CLINIC | Age: 78
End: 2023-05-05
Payer: MEDICARE

## 2023-05-05 PROCEDURE — 77066 DX MAMMO INCL CAD BI: CPT

## 2023-05-05 PROCEDURE — G0279: CPT

## 2023-05-05 PROCEDURE — 76641 ULTRASOUND BREAST COMPLETE: CPT | Mod: 50

## 2023-05-09 ENCOUNTER — RESULT REVIEW (OUTPATIENT)
Age: 78
End: 2023-05-09

## 2023-05-09 ENCOUNTER — APPOINTMENT (OUTPATIENT)
Dept: ULTRASOUND IMAGING | Facility: CLINIC | Age: 78
End: 2023-05-09
Payer: MEDICARE

## 2023-05-09 ENCOUNTER — OUTPATIENT (OUTPATIENT)
Dept: OUTPATIENT SERVICES | Facility: HOSPITAL | Age: 78
LOS: 1 days | End: 2023-05-09
Payer: MEDICARE

## 2023-05-09 DIAGNOSIS — Z98.890 OTHER SPECIFIED POSTPROCEDURAL STATES: Chronic | ICD-10-CM

## 2023-05-09 DIAGNOSIS — Z00.8 ENCOUNTER FOR OTHER GENERAL EXAMINATION: ICD-10-CM

## 2023-05-09 DIAGNOSIS — M12.9 ARTHROPATHY, UNSPECIFIED: Chronic | ICD-10-CM

## 2023-05-09 DIAGNOSIS — C50.511 MALIGNANT NEOPLASM OF LOWER-OUTER QUADRANT OF RIGHT FEMALE BREAST: ICD-10-CM

## 2023-05-09 DIAGNOSIS — N63.0 UNSPECIFIED LUMP IN UNSPECIFIED BREAST: ICD-10-CM

## 2023-05-09 PROCEDURE — 77065 DX MAMMO INCL CAD UNI: CPT | Mod: 26,RT

## 2023-05-09 PROCEDURE — 77065 DX MAMMO INCL CAD UNI: CPT

## 2023-05-09 PROCEDURE — 19083 BX BREAST 1ST LESION US IMAG: CPT | Mod: RT

## 2023-05-09 PROCEDURE — A4648: CPT

## 2023-05-09 PROCEDURE — 88305 TISSUE EXAM BY PATHOLOGIST: CPT

## 2023-05-09 PROCEDURE — 19083 BX BREAST 1ST LESION US IMAG: CPT

## 2023-05-09 PROCEDURE — 88305 TISSUE EXAM BY PATHOLOGIST: CPT | Mod: 26

## 2023-05-15 LAB — SURGICAL PATHOLOGY STUDY: SIGNIFICANT CHANGE UP

## 2023-05-25 NOTE — BRIEF OPERATIVE NOTE - OPERATION/FINDINGS
R breast lumpectomy with magseed localization, R axillary sentinel LN biopsy    clip and magseed confirmed in lumpectomy, R axillary contents sent but no obvious involved LN noticed yes

## 2023-08-09 NOTE — ED PROVIDER NOTE - CPE EDP NEURO NORM
CAD (coronary artery disease) CAD (coronary artery disease) Need for prophylactic measure Need for prophylactic measure normal...

## 2023-09-24 ENCOUNTER — OUTPATIENT (OUTPATIENT)
Dept: OUTPATIENT SERVICES | Facility: HOSPITAL | Age: 78
LOS: 1 days | Discharge: HOME | End: 2023-09-24

## 2023-09-24 DIAGNOSIS — M12.9 ARTHROPATHY, UNSPECIFIED: Chronic | ICD-10-CM

## 2023-09-24 DIAGNOSIS — Z12.39 ENCOUNTER FOR OTHER SCREENING FOR MALIGNANT NEOPLASM OF BREAST: ICD-10-CM

## 2023-09-24 DIAGNOSIS — Z98.890 OTHER SPECIFIED POSTPROCEDURAL STATES: Chronic | ICD-10-CM

## 2023-09-26 ENCOUNTER — RESULT REVIEW (OUTPATIENT)
Age: 78
End: 2023-09-26

## 2023-09-26 ENCOUNTER — APPOINTMENT (OUTPATIENT)
Dept: HEMATOLOGY ONCOLOGY | Facility: CLINIC | Age: 78
End: 2023-09-26
Payer: MEDICARE

## 2023-09-26 VITALS
DIASTOLIC BLOOD PRESSURE: 81 MMHG | BODY MASS INDEX: 31.27 KG/M2 | OXYGEN SATURATION: 97 % | RESPIRATION RATE: 17 BRPM | SYSTOLIC BLOOD PRESSURE: 137 MMHG | WEIGHT: 159.26 LBS | TEMPERATURE: 97.2 F | HEIGHT: 60 IN | HEART RATE: 73 BPM

## 2023-09-26 DIAGNOSIS — Z79.899 OTHER LONG TERM (CURRENT) DRUG THERAPY: ICD-10-CM

## 2023-09-26 LAB
25(OH)D3 SERPL-MCNC: 67.4 NG/ML
ALBUMIN SERPL ELPH-MCNC: 4.4 G/DL
ALP BLD-CCNC: 94 U/L
ALT SERPL-CCNC: 15 U/L
ANION GAP SERPL CALC-SCNC: 7 MMOL/L
AST SERPL-CCNC: 18 U/L
BASOPHILS # BLD AUTO: 0.04 K/UL — SIGNIFICANT CHANGE UP (ref 0–0.2)
BASOPHILS NFR BLD AUTO: 0.6 % — SIGNIFICANT CHANGE UP (ref 0–2)
BILIRUB SERPL-MCNC: 0.4 MG/DL
BUN SERPL-MCNC: 12 MG/DL
CALCIUM SERPL-MCNC: 9.3 MG/DL
CHLORIDE SERPL-SCNC: 104 MMOL/L
CO2 SERPL-SCNC: 29 MMOL/L
CREAT SERPL-MCNC: 0.78 MG/DL
EGFR: 79 ML/MIN/1.73M2
EOSINOPHIL # BLD AUTO: 0.14 K/UL — SIGNIFICANT CHANGE UP (ref 0–0.5)
EOSINOPHIL NFR BLD AUTO: 2.2 % — SIGNIFICANT CHANGE UP (ref 0–6)
GLUCOSE SERPL-MCNC: 73 MG/DL
HCT VFR BLD CALC: 40.3 % — SIGNIFICANT CHANGE UP (ref 34.5–45)
HGB BLD-MCNC: 13.1 G/DL — SIGNIFICANT CHANGE UP (ref 11.5–15.5)
IMM GRANULOCYTES NFR BLD AUTO: 0.2 % — SIGNIFICANT CHANGE UP (ref 0–0.9)
LYMPHOCYTES # BLD AUTO: 1.67 K/UL — SIGNIFICANT CHANGE UP (ref 1–3.3)
LYMPHOCYTES # BLD AUTO: 26.5 % — SIGNIFICANT CHANGE UP (ref 13–44)
MCHC RBC-ENTMCNC: 30.2 PG — SIGNIFICANT CHANGE UP (ref 27–34)
MCHC RBC-ENTMCNC: 32.5 G/DL — SIGNIFICANT CHANGE UP (ref 32–36)
MCV RBC AUTO: 92.9 FL — SIGNIFICANT CHANGE UP (ref 80–100)
MONOCYTES # BLD AUTO: 0.55 K/UL — SIGNIFICANT CHANGE UP (ref 0–0.9)
MONOCYTES NFR BLD AUTO: 8.7 % — SIGNIFICANT CHANGE UP (ref 2–14)
NEUTROPHILS # BLD AUTO: 3.89 K/UL — SIGNIFICANT CHANGE UP (ref 1.8–7.4)
NEUTROPHILS NFR BLD AUTO: 61.8 % — SIGNIFICANT CHANGE UP (ref 43–77)
NRBC # BLD: 0 /100 WBCS — SIGNIFICANT CHANGE UP (ref 0–0)
PLATELET # BLD AUTO: 176 K/UL — SIGNIFICANT CHANGE UP (ref 150–400)
POTASSIUM SERPL-SCNC: 4.2 MMOL/L
PROT SERPL-MCNC: 7.3 G/DL
RBC # BLD: 4.34 M/UL — SIGNIFICANT CHANGE UP (ref 3.8–5.2)
RBC # FLD: 13.2 % — SIGNIFICANT CHANGE UP (ref 10.3–14.5)
SODIUM SERPL-SCNC: 140 MMOL/L
WBC # BLD: 6.3 K/UL — SIGNIFICANT CHANGE UP (ref 3.8–10.5)
WBC # FLD AUTO: 6.3 K/UL — SIGNIFICANT CHANGE UP (ref 3.8–10.5)

## 2023-09-26 PROCEDURE — 99214 OFFICE O/P EST MOD 30 MIN: CPT

## 2023-09-27 PROBLEM — Z79.899 HIGH RISK MEDICATION USE: Status: ACTIVE | Noted: 2022-08-18

## 2023-10-05 ENCOUNTER — APPOINTMENT (OUTPATIENT)
Dept: SURGICAL ONCOLOGY | Facility: CLINIC | Age: 78
End: 2023-10-05
Payer: MEDICARE

## 2023-10-05 VITALS
SYSTOLIC BLOOD PRESSURE: 124 MMHG | DIASTOLIC BLOOD PRESSURE: 79 MMHG | HEIGHT: 60 IN | RESPIRATION RATE: 17 BRPM | HEART RATE: 76 BPM | BODY MASS INDEX: 31.22 KG/M2 | OXYGEN SATURATION: 98 % | WEIGHT: 159 LBS

## 2023-10-05 PROCEDURE — 99214 OFFICE O/P EST MOD 30 MIN: CPT

## 2024-01-10 ENCOUNTER — APPOINTMENT (OUTPATIENT)
Dept: DERMATOLOGY | Facility: CLINIC | Age: 79
End: 2024-01-10
Payer: MEDICARE

## 2024-01-10 DIAGNOSIS — L82.1 OTHER SEBORRHEIC KERATOSIS: ICD-10-CM

## 2024-01-10 PROCEDURE — 99203 OFFICE O/P NEW LOW 30 MIN: CPT

## 2024-01-10 NOTE — HISTORY OF PRESENT ILLNESS
[FreeTextEntry1] : NPV: spot on head  [de-identified] : ARNAUD NASH is a 78-year-old female new patient with hx breast CA s/p radiation currently on anastrozole who presents for evaluation of the followin. Growth on the scalp, first noticed it a few months ago, feels it is growing in size/becoming harder and darker in appearance. Overall asx. Has had similar spots treated with LN2 on face in the past. Also with spots on back and face   No personal hx of skin cancer

## 2024-01-10 NOTE — PHYSICAL EXAM
[FreeTextEntry3] : - waxy brown plaque on the R vertex scalp with cerebriform appearance under dermoscopy  - waxy brown stuck-on papules and plaques on the back and face

## 2024-01-10 NOTE — ASSESSMENT
[FreeTextEntry1] : 1. Seborrheic keratoses, right vertex scalp, back and face  - Discussed nature of these benign lesions  - Related to genetics - these lesions run in families; NOT related to sun exposure   RTC PRN

## 2024-01-18 ENCOUNTER — APPOINTMENT (OUTPATIENT)
Dept: DERMATOLOGY | Facility: CLINIC | Age: 79
End: 2024-01-18

## 2024-01-23 ENCOUNTER — NON-APPOINTMENT (OUTPATIENT)
Age: 79
End: 2024-01-23

## 2024-01-23 ENCOUNTER — APPOINTMENT (OUTPATIENT)
Dept: INTERNAL MEDICINE | Facility: CLINIC | Age: 79
End: 2024-01-23
Payer: MEDICARE

## 2024-01-23 ENCOUNTER — LABORATORY RESULT (OUTPATIENT)
Age: 79
End: 2024-01-23

## 2024-01-23 VITALS
HEART RATE: 73 BPM | OXYGEN SATURATION: 100 % | RESPIRATION RATE: 14 BRPM | BODY MASS INDEX: 30.66 KG/M2 | WEIGHT: 157 LBS

## 2024-01-23 DIAGNOSIS — Z00.00 ENCOUNTER FOR GENERAL ADULT MEDICAL EXAMINATION W/OUT ABNORMAL FINDINGS: ICD-10-CM

## 2024-01-23 PROCEDURE — 99397 PER PM REEVAL EST PAT 65+ YR: CPT

## 2024-01-23 PROCEDURE — G0444 DEPRESSION SCREEN ANNUAL: CPT | Mod: 59

## 2024-01-28 VITALS — SYSTOLIC BLOOD PRESSURE: 135 MMHG | DIASTOLIC BLOOD PRESSURE: 80 MMHG

## 2024-01-28 NOTE — PHYSICAL EXAM
[No Acute Distress] : no acute distress [Normal Sclera/Conjunctiva] : normal sclera/conjunctiva [EOMI] : extraocular movements intact [Normal Outer Ear/Nose] : the outer ears and nose were normal in appearance [Normal Oropharynx] : the oropharynx was normal [No JVD] : no jugular venous distention [No Lymphadenopathy] : no lymphadenopathy [Supple] : supple [Thyroid Normal, No Nodules] : the thyroid was normal and there were no nodules present [No Accessory Muscle Use] : no accessory muscle use [No Respiratory Distress] : no respiratory distress  [Normal Rate] : normal rate  [Clear to Auscultation] : lungs were clear to auscultation bilaterally [Regular Rhythm] : with a regular rhythm [Normal S1, S2] : normal S1 and S2 [No Murmur] : no murmur heard [No Carotid Bruits] : no carotid bruits [Pedal Pulses Present] : the pedal pulses are present [No Edema] : there was no peripheral edema [No Extremity Clubbing/Cyanosis] : no extremity clubbing/cyanosis [Soft] : abdomen soft [Non Tender] : non-tender [Non-distended] : non-distended [Normal Bowel Sounds] : normal bowel sounds [No CVA Tenderness] : no CVA  tenderness [No Spinal Tenderness] : no spinal tenderness [No Joint Swelling] : no joint swelling [Grossly Normal Strength/Tone] : grossly normal strength/tone [No Rash] : no rash [Coordination Grossly Intact] : coordination grossly intact [No Focal Deficits] : no focal deficits [Normal Gait] : normal gait [Normal Affect] : the affect was normal [Normal Insight/Judgement] : insight and judgment were intact [de-identified] : +varicose vein

## 2024-01-28 NOTE — PLAN
[FreeTextEntry1] : See plan  Breast cancer  Following with Oncology every 3  months cont Anastrozole 1 mg daily

## 2024-01-28 NOTE — END OF VISIT
[FreeTextEntry3] :    Documented by Fabian Mcfarland acting as a scribe under Dr. Rollins. 01/23/2024

## 2024-01-28 NOTE — HEALTH RISK ASSESSMENT
[Very Good] : ~his/her~ current health as very good [Good] : ~his/her~  mood as  good [No] : No [Any fall with injury in past year] : Patient reported fall with injury in the past year [0] : 2) Feeling down, depressed, or hopeless: Not at all (0) [PHQ-2 Negative - No further assessment needed] : PHQ-2 Negative - No further assessment needed [Patient reported mammogram was normal] : Patient reported mammogram was normal [Patient reported bone density results were abnormal] : Patient reported bone density results were abnormal [With Family] : lives with family [# of Members in Household ___] :  household currently consist of [unfilled] member(s) [Retired] : retired [Less Than High School] : less than high school [] :  [Feels Safe at Home] : Feels safe at home [Fully functional (bathing, dressing, toileting, transferring, walking, feeding)] : Fully functional (bathing, dressing, toileting, transferring, walking, feeding) [Fully functional (using the telephone, shopping, preparing meals, housekeeping, doing laundry, using] : Fully functional and needs no help or supervision to perform IADLs (using the telephone, shopping, preparing meals, housekeeping, doing laundry, using transportation, managing medications and managing finances) [Reports normal functional visual acuity (ie: able to read med bottle)] : Reports normal functional visual acuity [Smoke Detector] : smoke detector [Carbon Monoxide Detector] : carbon monoxide detector [Safety elements used in home] : safety elements used in home [Seat Belt] :  uses seat belt [Sunscreen] : uses sunscreen [Former] : Former [5-9] : 5-9 [> 15 Years] : > 15 Years [de-identified] : Maintains active by walking. [de-identified] : Maintains a healthy diet.  [[id number]] : 0 [de-identified] : Rib fracture of 11th rib, left [Reports changes in hearing] : Reports no changes in hearing [Reports changes in vision] : Reports no changes in vision [Reports changes in dental health] : Reports no changes in dental health [Guns at Home] : no guns at home [Travel to Developing Areas] : does not  travel to developing areas [Caregiver Concerns] : does not have caregiver concerns [TB Exposure] : is not being exposed to tuberculosis [MammogramDate] : 05/2023 [BoneDensityDate] : 2023 [BoneDensityComments] : osteopenia [ColonoscopyDate] : 11/2023 [de-identified] :  [de-identified] : Follows with ophthalmologist every six months. Wears progressive lenses.  [de-identified] : Follows with dentist.  [de-identified] : Quit 40 years ago

## 2024-01-28 NOTE — HISTORY OF PRESENT ILLNESS
[de-identified] : Sudha Flores is a 77 year old female with history of Right breast cancer s/p lumpectomy and radiation 2001 presents for a comprehensive physical exam.  Pt states that she is well and doing fine.  She has been seeing a oncologist for breast cancer Pt has osteopenia. She states that she takes calcium and Vit D daily. Denies any weakness, pain.  She also has borderline high BP. Has Fhx of high BP (brother and sister). Admits that she drinks ~2 cups of coffee daily, and used to be 4 cups. Denies HA, dizziness, N/V, SOB/CP. She is otherwise well and offers no complaints.

## 2024-01-29 DIAGNOSIS — R31.9 HEMATURIA, UNSPECIFIED: ICD-10-CM

## 2024-01-29 LAB
25(OH)D3 SERPL-MCNC: 68.4 NG/ML
ALBUMIN SERPL ELPH-MCNC: 4.3 G/DL
ALP BLD-CCNC: 89 U/L
ALT SERPL-CCNC: 19 U/L
ANION GAP SERPL CALC-SCNC: 10 MMOL/L
APPEARANCE: CLEAR
AST SERPL-CCNC: 26 U/L
BILIRUB SERPL-MCNC: 0.3 MG/DL
BILIRUBIN URINE: NEGATIVE
BLOOD URINE: ABNORMAL
BUN SERPL-MCNC: 16 MG/DL
CALCIUM SERPL-MCNC: 9.8 MG/DL
CANCER AG15-3 SERPL-ACNC: 17.8 U/ML
CANCER AG27-29 SERPL-ACNC: 15.8 U/ML
CEA SERPL-MCNC: 2.8 NG/ML
CHLORIDE SERPL-SCNC: 98 MMOL/L
CHOLEST SERPL-MCNC: 196 MG/DL
CO2 SERPL-SCNC: 27 MMOL/L
COLOR: YELLOW
CREAT SERPL-MCNC: 0.75 MG/DL
EGFR: 82 ML/MIN/1.73M2
ESTIMATED AVERAGE GLUCOSE: 108 MG/DL
GLUCOSE QUALITATIVE U: NEGATIVE MG/DL
GLUCOSE SERPL-MCNC: 88 MG/DL
HBA1C MFR BLD HPLC: 5.4 %
HDLC SERPL-MCNC: 90 MG/DL
KETONES URINE: NEGATIVE MG/DL
LDLC SERPL CALC-MCNC: 89 MG/DL
LEUKOCYTE ESTERASE URINE: NEGATIVE
NITRITE URINE: NEGATIVE
NONHDLC SERPL-MCNC: 106 MG/DL
PH URINE: 6.5
POTASSIUM SERPL-SCNC: 4.1 MMOL/L
PROT SERPL-MCNC: 7.8 G/DL
PROTEIN URINE: NEGATIVE MG/DL
SODIUM SERPL-SCNC: 135 MMOL/L
SPECIFIC GRAVITY URINE: 1
TRIGL SERPL-MCNC: 100 MG/DL
TSH SERPL-ACNC: 1.81 UIU/ML
UROBILINOGEN URINE: 0.2 MG/DL
VIT B12 SERPL-MCNC: 755 PG/ML

## 2024-02-15 ENCOUNTER — RESULT REVIEW (OUTPATIENT)
Age: 79
End: 2024-02-15

## 2024-02-15 ENCOUNTER — APPOINTMENT (OUTPATIENT)
Dept: SURGICAL ONCOLOGY | Facility: CLINIC | Age: 79
End: 2024-02-15
Payer: MEDICARE

## 2024-02-15 VITALS
BODY MASS INDEX: 30.63 KG/M2 | OXYGEN SATURATION: 99 % | HEIGHT: 60 IN | RESPIRATION RATE: 17 BRPM | DIASTOLIC BLOOD PRESSURE: 85 MMHG | SYSTOLIC BLOOD PRESSURE: 148 MMHG | WEIGHT: 156 LBS | HEART RATE: 81 BPM

## 2024-02-15 PROCEDURE — 99214 OFFICE O/P EST MOD 30 MIN: CPT

## 2024-02-15 NOTE — PHYSICAL EXAM
[Normal] : supple, no neck mass and thyroid not enlarged [Normal Supraclavicular Lymph Nodes] : normal supraclavicular lymph nodes [Normal Axillary Lymph Nodes] : normal axillary lymph nodes [Normal] : oriented to person, place and time, with appropriate affect [de-identified] : distortion of the right breast from the lumpectomy but no masses bilaterally

## 2024-02-15 NOTE — ASSESSMENT
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 Morgan  PLAN:  RTO 4 months; then q 6 months labs w/ Dr. Negrete B/L mammo/sono 5/2024

## 2024-02-15 NOTE — HISTORY OF PRESENT ILLNESS
[de-identified] : Ms. ARNAUD NASH is a 78 year old female who presents today for a follow up visit.   HX: right breast lumpectomy and axillary dissection with radiation and 5 years of Tamoxifen for a right breast cancer 20 years ago. 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  Patient currently taking anastrozole under the care of  Dr. Negrete (previously Dr. Prescott)  F/U right breast mammo/sono 11/2022- BIRADS 2  Bilateral mammogram/sonogram/ 5/5/23- mammogram negative.  Ultrasound demonstrating new hypoechoic nodule at the 8:00 position of the right breast, possibly representing an oil cyst. Ultrasound-guided core biopsy is recommended for definitive diagnosis (BIRADS 4a).  She is s/p ultrasound-guided biopsy of the right breast 8:00 N& on 5/9/23.  Pathology demonstrated fat necrosis (benign and concorant).  Labs 1/2024: WNL

## 2024-02-23 ENCOUNTER — LABORATORY RESULT (OUTPATIENT)
Age: 79
End: 2024-02-23

## 2024-02-23 ENCOUNTER — APPOINTMENT (OUTPATIENT)
Dept: INTERNAL MEDICINE | Facility: CLINIC | Age: 79
End: 2024-02-23
Payer: MEDICARE

## 2024-02-23 VITALS
BODY MASS INDEX: 30.63 KG/M2 | HEIGHT: 60 IN | OXYGEN SATURATION: 99 % | RESPIRATION RATE: 12 BRPM | TEMPERATURE: 97.1 F | WEIGHT: 156 LBS | HEART RATE: 68 BPM

## 2024-02-23 VITALS — DIASTOLIC BLOOD PRESSURE: 85 MMHG | SYSTOLIC BLOOD PRESSURE: 135 MMHG

## 2024-02-23 DIAGNOSIS — R03.0 ELEVATED BLOOD-PRESSURE READING, W/OUT DIAGNOSIS OF HYPERTENSION: ICD-10-CM

## 2024-02-23 PROCEDURE — 99214 OFFICE O/P EST MOD 30 MIN: CPT

## 2024-02-25 PROBLEM — R03.0 BLOOD PRESSURE ELEVATED WITHOUT HISTORY OF HTN: Status: ACTIVE | Noted: 2024-01-23

## 2024-02-25 NOTE — HISTORY OF PRESENT ILLNESS
[de-identified] : Sudha Flores is a 77 year old female with history of Right breast cancer s/p lumpectomy and radiation 2001 presents for a follow up on elevated blood pressure.  She reports that she does not wish to take any BP medications and would rather find alternative ways that include modifying her lifestyle. She claims that she has significantly reduced the amount of salty foods. Has a fhx of high BP (brother).  She has been trying to walk more and move around more frequently.  Denies any exertional chest pain, dyspnea, palpitations, headaches, and dizziness.  Recent bloodwork showed that she had trace of blood in urine. Claims that she has had trace of blood in urine for years. Denies any UTI sxs including dysuria, frequent urination, cloudy or dark urine. Denies any flank pain.

## 2024-02-25 NOTE — PHYSICAL EXAM
[No Acute Distress] : no acute distress [Normal Sclera/Conjunctiva] : normal sclera/conjunctiva [EOMI] : extraocular movements intact [Normal Outer Ear/Nose] : the outer ears and nose were normal in appearance [Normal Oropharynx] : the oropharynx was normal [No JVD] : no jugular venous distention [No Lymphadenopathy] : no lymphadenopathy [Supple] : supple [Thyroid Normal, No Nodules] : the thyroid was normal and there were no nodules present [No Respiratory Distress] : no respiratory distress  [Clear to Auscultation] : lungs were clear to auscultation bilaterally [No Accessory Muscle Use] : no accessory muscle use [Normal Rate] : normal rate  [Regular Rhythm] : with a regular rhythm [Normal S1, S2] : normal S1 and S2 [No Murmur] : no murmur heard [No Edema] : there was no peripheral edema [Pedal Pulses Present] : the pedal pulses are present [No Carotid Bruits] : no carotid bruits [No Extremity Clubbing/Cyanosis] : no extremity clubbing/cyanosis [Soft] : abdomen soft [Non Tender] : non-tender [Non-distended] : non-distended [Normal Bowel Sounds] : normal bowel sounds [No CVA Tenderness] : no CVA  tenderness [No Spinal Tenderness] : no spinal tenderness [No Joint Swelling] : no joint swelling [Grossly Normal Strength/Tone] : grossly normal strength/tone [No Rash] : no rash [Coordination Grossly Intact] : coordination grossly intact [No Focal Deficits] : no focal deficits [Normal Gait] : normal gait [Normal Insight/Judgement] : insight and judgment were intact [Normal Affect] : the affect was normal [de-identified] : +varicose vein

## 2024-02-25 NOTE — PLAN
[FreeTextEntry1] : See plan  Trace blood in urine Urinalysis ordered US of kidney and bladder  Urology referral  Breast cancer  Following with Oncology every 3  months cont Anastrozole 1 mg daily    Htn, borderline cont low Na diet/ exercise

## 2024-02-25 NOTE — END OF VISIT
[FreeTextEntry3] :    Documented by Fabian Mcfarland acting as a scribe under Dr. Rollins. 02/23/2024

## 2024-02-27 ENCOUNTER — NON-APPOINTMENT (OUTPATIENT)
Age: 79
End: 2024-02-27

## 2024-02-27 ENCOUNTER — OUTPATIENT (OUTPATIENT)
Dept: OUTPATIENT SERVICES | Facility: HOSPITAL | Age: 79
LOS: 1 days | Discharge: HOME | End: 2024-02-27

## 2024-02-27 DIAGNOSIS — M12.9 ARTHROPATHY, UNSPECIFIED: Chronic | ICD-10-CM

## 2024-02-27 DIAGNOSIS — C50.511 MALIGNANT NEOPLASM OF LOWER-OUTER QUADRANT OF RIGHT FEMALE BREAST: ICD-10-CM

## 2024-02-27 DIAGNOSIS — Z98.890 OTHER SPECIFIED POSTPROCEDURAL STATES: Chronic | ICD-10-CM

## 2024-02-28 LAB — URINE CYTOLOGY: NORMAL

## 2024-02-29 LAB
APPEARANCE: CLEAR
BILIRUBIN URINE: NEGATIVE
BLOOD URINE: ABNORMAL
COLOR: YELLOW
GLUCOSE QUALITATIVE U: NEGATIVE MG/DL
KETONES URINE: NEGATIVE MG/DL
LEUKOCYTE ESTERASE URINE: NEGATIVE
NITRITE URINE: NEGATIVE
PH URINE: 7.5
PROTEIN URINE: NEGATIVE MG/DL
SPECIFIC GRAVITY URINE: 1.01
UROBILINOGEN URINE: 0.2 MG/DL

## 2024-03-05 ENCOUNTER — APPOINTMENT (OUTPATIENT)
Dept: HEMATOLOGY ONCOLOGY | Facility: CLINIC | Age: 79
End: 2024-03-05
Payer: MEDICARE

## 2024-03-05 VITALS
RESPIRATION RATE: 16 BRPM | BODY MASS INDEX: 30.47 KG/M2 | WEIGHT: 156 LBS | HEART RATE: 67 BPM | DIASTOLIC BLOOD PRESSURE: 84 MMHG | OXYGEN SATURATION: 98 % | TEMPERATURE: 97.5 F | SYSTOLIC BLOOD PRESSURE: 137 MMHG

## 2024-03-05 PROCEDURE — 99214 OFFICE O/P EST MOD 30 MIN: CPT

## 2024-03-10 NOTE — ASSESSMENT
[FreeTextEntry1] : ARNAUD NASH  is a 78 year old female with pmh of right breast moderately differentiated invasive breast cancer in 9/2001, pT1aN0,  ER 80%, AR 30% and HER2 positive, s/p right breast lumpectomy and axillary dissection, s/p RT and 5 years of endocrine therapy.  Pt dx 2022 w/ new primary right breast invasive moderately to poorly differentiated ductal carcinoma, pT1bNx, ER positive > 95%, AR positive >90%, HER2 negative. Oncotype Dx score 14. Started on endocrine therapy with anastrozole in 6/2022  - ELZA x 2 years - plan for endocrine therapy with for total of 5-7 years - BMD Osteopenia T -1.4 3/2022 - Next due 3/2024; script in allscripts - Recommend to continue to follow up with Dr. Snell and Dr. Mas as recommended. - last mammo/sono annually in 5/2023 with new area of right breast 8:00 fat necrosis; - repeat mammo/sono scheduled for 5/14/24; to see Dr. Snell thereafter - labs done by PMD in 1/2024 are within normal limits; tumor markers being ordered by PMD are NOT recommended from oncology standpoint. - Patient had the opportunity to have all their questions answered to their satisfaction  - FU in 6 months or sooner if needed

## 2024-03-10 NOTE — PHYSICAL EXAM
[Fully active, able to carry on all pre-disease performance without restriction] : Status 0 - Fully active, able to carry on all pre-disease performance without restriction [Normal] : affect appropriate [de-identified] : right lumpectomy well healed;retraction of breast RLQ at surgical incision site overall unchanged; No palpable masses b/l, no chest wall changes, no skin or nipple changes noted; no axillary adenopathy;  left breast No palpable masses  no chest wall changes, no skin or nipple changes noted; no axillary adenopathy

## 2024-03-10 NOTE — HISTORY OF PRESENT ILLNESS
[100: Normal, no complaints, no evidence of disease.] : 100: Normal, no complaints, no evidence of disease. [de-identified] : ARNAUD NASH  is a 78 year old female here for initial consultation for management of right breast cancer.   In 2001, she had a right breast lumpectomy and axillary dissection with Dr. Jolly, followed by right breast radiation therapy. Pathology from [facility] showed a 0.2 cm moderately differentiated invasive breast cancer with extensive intraductal component of high grade comedo type DCIS. The invasive tumor was ER 80%, OH 30% and HER2 positive. One right sentinel lymph node was negative for tumor. She received adjuvant RT, reportedly for 6 weeks, followed by 2 years of tamoxifen and 3 years Arimidex by Dr. Marcus Conde.    She continued regular mammography and was without evidence of disease until 3/2022 On 3/15/22 diagnostic mammogram showed an irregularly marginated mass in the upper outer quadrant of the right breast measuring 11mm with associated microcalcifications. Ultrasound showed a solid mass with irregular margins measuring 11 x 4 x 8 mm at 9:00, 4 cm from the nipple. At 10:00, there was a post-surgical scar. There was no evidence of axillary lymphadenopathy.   Ultrasound guided core biopsy of the right breast mass at 9:00 on 3/22/22 revealed invasive moderately to poorly differentiated ductal carcinoma with focal micropapillary features, Pretty score 7/9. Invasive tumor measured at least 0.4 cm. There was no DCIS and no lymphvascular invasion. The tumor was ER positive > 95%, OH positive >90%, HER2 negative.   She underwent right lumpectomy with SLNB on 22. Pathology showed invasive moderately differentiated ductal carcinoma with micropapillary features, measuring 9 mm. DCIS was present with intermediate nuclear grade, measuring up to 2 mm in largest dimension, present in 2 out of 9 blocks. Margins were negative, measuring at least 5 mm for both the invasive and intraductal components. Right axillary sentinel LN excision showed skeletal muscle and fibroadipose tissue, with no lymphoid tissue present.  Oncotype Dx score 14.   Genetics 3/31/22 Quantitative Medicine genetics negative for 8 deleterious genes.  Paternal cousin breast cancer age 60's. Paternal cousin's daughter brain cancer age 40. Brain cancer in brother age 60. Other brother with likely BCC.  Limited family history due to immigration here. Of note,  was diagnosed with breast cancer and not tolerating treatments  Risk factors: Prior breast disease: Menarche: 16. Postmenopausal, menses regular. . Age of first pregnancy 26. Breast feeding: no Oral contraceptive use: no. Other hormone exposure: None. The patient is not of Ashkenazi ethnic background.    [de-identified] : 3/5/24 Patient presents today to rule out progression/recurrence breast cancer and assess treatment toxicity. Started anastrozole on 6/3/2022 - tolerating very well Since last visit she had new Right breast nodule noted on imaging on 5/4/23 - Biopsy on 5/9/23 confirmed fat necrosis; will be seeing Dr. Snell on 10/5/23  Travels intermittently to [place] ([place] for 4 months at a time Patient denies any hotflashes, arthralgias, vaginal dryness, vaginal bleeding, hair loss, muscle cramps. Patient denies any SOB, CP, abdominal pain, bone pain, headache, or unexplained weight loss Patient denies any breast masses, breast tenderness, skin changes or nipple discharge.  Genetics: Ambry 8 gene panel - negative  HEALTH MAINTENANCE  PCP Dr. Ayo GEORGE glaucoma BMD 3/15/22 osteopenia -1.7, on vit D GI 2021 up to date with colonoscopy,h/o polyps GYN pap smear 44907 ,no vaginal bleeding or spotting s/p COVID vaccine and booster left arm [ECOG Performance Status: 0 - Fully active, able to carry on all pre-disease performance without restriction] : Performance Status: 0 - Fully active, able to carry on all pre-disease performance without restriction

## 2024-03-18 ENCOUNTER — APPOINTMENT (OUTPATIENT)
Dept: UROLOGY | Facility: CLINIC | Age: 79
End: 2024-03-18
Payer: MEDICARE

## 2024-03-18 VITALS
RESPIRATION RATE: 14 BRPM | TEMPERATURE: 97.3 F | WEIGHT: 156 LBS | OXYGEN SATURATION: 97 % | SYSTOLIC BLOOD PRESSURE: 133 MMHG | HEART RATE: 63 BPM | DIASTOLIC BLOOD PRESSURE: 76 MMHG | BODY MASS INDEX: 30.47 KG/M2

## 2024-03-18 DIAGNOSIS — N28.1 CYST OF KIDNEY, ACQUIRED: ICD-10-CM

## 2024-03-18 DIAGNOSIS — R31.29 OTHER MICROSCOPIC HEMATURIA: ICD-10-CM

## 2024-03-18 PROCEDURE — G2211 COMPLEX E/M VISIT ADD ON: CPT

## 2024-03-18 PROCEDURE — 99204 OFFICE O/P NEW MOD 45 MIN: CPT

## 2024-03-18 NOTE — HISTORY OF PRESENT ILLNESS
[FreeTextEntry1] : Very pleasant 78 year old woman who presents for evaluation of trace blood found on urinalysis. She reports no history of gross hematuria.  No flank pain. No suprapubic pain. No dysuria.  No urinary frequency, urgency. No history of urinary tract infections or renal impairment. No aggravating or alleviating factors that she knows of contributing to hematuria.  No family history of  malignancy.  No family history of nephrolithiasis.  Recent urinalysis demonstrated 0 red blood cells per high-power field but trace blood.  Renal ultrasound demonstrated simple bilateral renal cysts but no other concerning abnormalities, including hydronephrosis or kidney stones.  Urine cytology negative.  She quit smoking many years ago.

## 2024-03-18 NOTE — PHYSICAL EXAM
[Normal Appearance] : normal appearance [Well Groomed] : well groomed [General Appearance - In No Acute Distress] : no acute distress [Edema] : no peripheral edema [Respiration, Rhythm And Depth] : normal respiratory rhythm and effort [Abdomen Soft] : soft [Exaggerated Use Of Accessory Muscles For Inspiration] : no accessory muscle use [Abdomen Tenderness] : non-tender [Costovertebral Angle Tenderness] : no ~M costovertebral angle tenderness [Urinary Bladder Findings] : the bladder was normal on palpation [Normal Station and Gait] : the gait and station were normal for the patient's age [] : no rash [No Focal Deficits] : no focal deficits [Oriented To Time, Place, And Person] : oriented to person, place, and time [Affect] : the affect was normal [Mood] : the mood was normal [No Palpable Adenopathy] : no palpable adenopathy

## 2024-03-18 NOTE — ASSESSMENT
[FreeTextEntry1] : Very pleasant 78-year-old woman who presents for evaluation of trace blood on urinalysis, renal cysts -Urinalysis demonstrates trace blood but 0 red blood cells per high-power field -Urine cytology negative -Renal ultrasound images from [facility] radiology reviewed demonstrating simple bilateral renal cysts but no hydronephrosis nor kidney stones or other concerning renal masses -Follow-up in 6 months with repeat urinalysis with microscopic examination -Given 0 red blood cells on microscopic exam we will forego a cystoscopy at this time  Patient is being seen today for evaluation and management of a chronic and longitudinal ongoing condition and I am of the primary treating physician

## 2024-05-07 ENCOUNTER — APPOINTMENT (OUTPATIENT)
Dept: MAMMOGRAPHY | Facility: CLINIC | Age: 79
End: 2024-05-07

## 2024-05-07 ENCOUNTER — APPOINTMENT (OUTPATIENT)
Dept: ULTRASOUND IMAGING | Facility: CLINIC | Age: 79
End: 2024-05-07

## 2024-05-08 ENCOUNTER — NON-APPOINTMENT (OUTPATIENT)
Age: 79
End: 2024-05-08

## 2024-05-08 RX ORDER — ANASTROZOLE TABLETS 1 MG/1
1 TABLET ORAL DAILY
Qty: 90 | Refills: 1 | Status: ACTIVE | COMMUNITY
Start: 2022-06-03 | End: 1900-01-01

## 2024-05-14 ENCOUNTER — RESULT REVIEW (OUTPATIENT)
Age: 79
End: 2024-05-14

## 2024-05-14 ENCOUNTER — APPOINTMENT (OUTPATIENT)
Dept: MAMMOGRAPHY | Facility: CLINIC | Age: 79
End: 2024-05-14
Payer: MEDICARE

## 2024-05-14 ENCOUNTER — APPOINTMENT (OUTPATIENT)
Dept: ULTRASOUND IMAGING | Facility: CLINIC | Age: 79
End: 2024-05-14
Payer: MEDICARE

## 2024-05-14 PROCEDURE — 76641 ULTRASOUND BREAST COMPLETE: CPT | Mod: 50

## 2024-05-14 PROCEDURE — G0279: CPT

## 2024-05-14 PROCEDURE — 77066 DX MAMMO INCL CAD BI: CPT

## 2024-05-16 ENCOUNTER — APPOINTMENT (OUTPATIENT)
Dept: SURGICAL ONCOLOGY | Facility: CLINIC | Age: 79
End: 2024-05-16
Payer: MEDICARE

## 2024-05-16 ENCOUNTER — APPOINTMENT (OUTPATIENT)
Dept: RADIOLOGY | Facility: IMAGING CENTER | Age: 79
End: 2024-05-16
Payer: MEDICARE

## 2024-05-16 ENCOUNTER — OUTPATIENT (OUTPATIENT)
Dept: OUTPATIENT SERVICES | Facility: HOSPITAL | Age: 79
LOS: 1 days | End: 2024-05-16
Payer: MEDICARE

## 2024-05-16 VITALS
BODY MASS INDEX: 30.63 KG/M2 | DIASTOLIC BLOOD PRESSURE: 80 MMHG | OXYGEN SATURATION: 98 % | SYSTOLIC BLOOD PRESSURE: 147 MMHG | WEIGHT: 156 LBS | HEART RATE: 65 BPM | HEIGHT: 60 IN

## 2024-05-16 DIAGNOSIS — Z79.899 OTHER LONG TERM (CURRENT) DRUG THERAPY: ICD-10-CM

## 2024-05-16 DIAGNOSIS — Z17.0 MALIGNANT NEOPLASM OF LOWER-OUTER QUADRANT OF RIGHT FEMALE BREAST: ICD-10-CM

## 2024-05-16 DIAGNOSIS — M12.9 ARTHROPATHY, UNSPECIFIED: Chronic | ICD-10-CM

## 2024-05-16 DIAGNOSIS — C50.511 MALIGNANT NEOPLASM OF LOWER-OUTER QUADRANT OF RIGHT FEMALE BREAST: ICD-10-CM

## 2024-05-16 DIAGNOSIS — Z98.890 OTHER SPECIFIED POSTPROCEDURAL STATES: Chronic | ICD-10-CM

## 2024-05-16 DIAGNOSIS — Z00.8 ENCOUNTER FOR OTHER GENERAL EXAMINATION: ICD-10-CM

## 2024-05-16 PROCEDURE — 99214 OFFICE O/P EST MOD 30 MIN: CPT

## 2024-05-16 PROCEDURE — 77080 DXA BONE DENSITY AXIAL: CPT | Mod: 26

## 2024-05-16 PROCEDURE — 77080 DXA BONE DENSITY AXIAL: CPT

## 2024-05-16 NOTE — HISTORY OF PRESENT ILLNESS
[de-identified] : Ms. ARNAUD NASH is a 78 year old female who presents today for a follow up visit.   HX: right breast lumpectomy and axillary dissection with radiation and 5 years of Tamoxifen for a right breast cancer 20 years ago. Family History: Brain cancer in brother BRCA negative    She is s/p right breast lumpectomy with SLNB on 4/20/22, path: Invasive ductal carcinoma, 9 mm, moderately differentiated, DCIS, 2 mm, margins negative, ER+/AR+/HER2-, pT1b Nx  Oncotype 14.  Patient currently taking anastrozole under the care of  Dr. Negrete (previously Dr. Prescott)  F/U right breast mammo/sono 11/2022- BIRADS 2  Bilateral mammogram/sonogram/ 5/5/23- mammogram negative.  Ultrasound demonstrating new hypoechoic nodule at the 8:00 position of the right breast, possibly representing an oil cyst. Ultrasound-guided core biopsy is recommended for definitive diagnosis (BIRADS 4a).  She is s/p ultrasound-guided biopsy of the right breast 8:00 N& on 5/9/23.  Pathology demonstrated fat necrosis (benign and concorant).  Labs 1/2024: WNL  B/L mammo/sono 5/14/24: Newly seen calcifications at the lumpectomy site on mammogram, likely fat necrosis, f/u mammogram in 6 months.  Ultrasound negative.  (BIRADS 3).   Denies palpable breast masses, nipple discharge, inversion or skin change.

## 2024-05-16 NOTE — ASSESSMENT
[FreeTextEntry1] : IMP:  HX of lumpectomy s/p tamoxifen and XRT 20yr ago in right breast now s/p right breast lumpectomy w/ SLNB on 4/20/22, path: Invasive ductal carcinoma, 9 mm, moderately differentiated, DCIS, 2 mm, margins negative, ER+/SC+/HER2-, pT1b Nx Oncotype 14 USGB Right breast 5/2023- fat necrosis ELZA on Arimidex 1 mg daily with Dr. Morgan  PLAN:  RTO 6 months labs w/ Dr. Negrete Right mammogram 11/2024 B/L mammo/sono 5/2025

## 2024-05-16 NOTE — PHYSICAL EXAM
[Normal] : supple, no neck mass and thyroid not enlarged [Normal Supraclavicular Lymph Nodes] : normal supraclavicular lymph nodes [Normal Axillary Lymph Nodes] : normal axillary lymph nodes [Normal] : oriented to person, place and time, with appropriate affect [de-identified] : distortion of the right breast from the lumpectomy but no masses bilaterally

## 2024-09-27 ENCOUNTER — OUTPATIENT (OUTPATIENT)
Dept: OUTPATIENT SERVICES | Facility: HOSPITAL | Age: 79
LOS: 1 days | Discharge: HOME | End: 2024-09-27

## 2024-09-27 DIAGNOSIS — Z98.890 OTHER SPECIFIED POSTPROCEDURAL STATES: Chronic | ICD-10-CM

## 2024-09-27 DIAGNOSIS — M12.9 ARTHROPATHY, UNSPECIFIED: Chronic | ICD-10-CM

## 2024-09-27 DIAGNOSIS — C50.911 MALIGNANT NEOPLASM OF UNSPECIFIED SITE OF RIGHT FEMALE BREAST: ICD-10-CM

## 2024-09-27 DIAGNOSIS — C50.511 MALIGNANT NEOPLASM OF LOWER-OUTER QUADRANT OF RIGHT FEMALE BREAST: ICD-10-CM

## 2024-10-15 ENCOUNTER — APPOINTMENT (OUTPATIENT)
Dept: HEMATOLOGY ONCOLOGY | Facility: CLINIC | Age: 79
End: 2024-10-15
Payer: MEDICARE

## 2024-10-15 VITALS
BODY MASS INDEX: 31 KG/M2 | SYSTOLIC BLOOD PRESSURE: 159 MMHG | RESPIRATION RATE: 16 BRPM | WEIGHT: 158.73 LBS | OXYGEN SATURATION: 99 % | TEMPERATURE: 97.7 F | DIASTOLIC BLOOD PRESSURE: 83 MMHG | HEART RATE: 61 BPM

## 2024-10-15 DIAGNOSIS — Z17.0 MALIGNANT NEOPLASM OF LOWER-OUTER QUADRANT OF RIGHT FEMALE BREAST: ICD-10-CM

## 2024-10-15 DIAGNOSIS — Z79.899 OTHER LONG TERM (CURRENT) DRUG THERAPY: ICD-10-CM

## 2024-10-15 DIAGNOSIS — C50.511 MALIGNANT NEOPLASM OF LOWER-OUTER QUADRANT OF RIGHT FEMALE BREAST: ICD-10-CM

## 2024-10-15 PROCEDURE — G2211 COMPLEX E/M VISIT ADD ON: CPT

## 2024-10-15 PROCEDURE — 99214 OFFICE O/P EST MOD 30 MIN: CPT

## 2024-11-19 ENCOUNTER — APPOINTMENT (OUTPATIENT)
Dept: MAMMOGRAPHY | Facility: CLINIC | Age: 79
End: 2024-11-19
Payer: MEDICARE

## 2024-11-19 ENCOUNTER — RESULT REVIEW (OUTPATIENT)
Age: 79
End: 2024-11-19

## 2024-11-19 PROCEDURE — 77065 DX MAMMO INCL CAD UNI: CPT | Mod: RT

## 2024-11-19 PROCEDURE — G0279: CPT

## 2024-11-25 ENCOUNTER — APPOINTMENT (OUTPATIENT)
Dept: SURGICAL ONCOLOGY | Facility: CLINIC | Age: 79
End: 2024-11-25
Payer: MEDICARE

## 2024-11-25 ENCOUNTER — NON-APPOINTMENT (OUTPATIENT)
Age: 79
End: 2024-11-25

## 2024-11-25 VITALS
WEIGHT: 155 LBS | HEART RATE: 67 BPM | SYSTOLIC BLOOD PRESSURE: 126 MMHG | OXYGEN SATURATION: 99 % | DIASTOLIC BLOOD PRESSURE: 82 MMHG | HEIGHT: 60 IN | BODY MASS INDEX: 30.43 KG/M2

## 2024-11-25 DIAGNOSIS — C50.919 MALIGNANT NEOPLASM OF UNSPECIFIED SITE OF UNSPECIFIED FEMALE BREAST: ICD-10-CM

## 2024-11-25 DIAGNOSIS — C50.511 MALIGNANT NEOPLASM OF LOWER-OUTER QUADRANT OF RIGHT FEMALE BREAST: ICD-10-CM

## 2024-11-25 DIAGNOSIS — Z17.0 MALIGNANT NEOPLASM OF LOWER-OUTER QUADRANT OF RIGHT FEMALE BREAST: ICD-10-CM

## 2024-11-25 PROCEDURE — 99214 OFFICE O/P EST MOD 30 MIN: CPT

## 2025-02-27 ENCOUNTER — APPOINTMENT (OUTPATIENT)
Dept: INTERNAL MEDICINE | Facility: CLINIC | Age: 80
End: 2025-02-27
Payer: MEDICARE

## 2025-02-27 VITALS
WEIGHT: 156 LBS | OXYGEN SATURATION: 98 % | HEIGHT: 60 IN | RESPIRATION RATE: 12 BRPM | BODY MASS INDEX: 30.63 KG/M2 | HEART RATE: 93 BPM

## 2025-02-27 VITALS — DIASTOLIC BLOOD PRESSURE: 82 MMHG | SYSTOLIC BLOOD PRESSURE: 122 MMHG

## 2025-02-27 DIAGNOSIS — Z17.0 MALIGNANT NEOPLASM OF LOWER-OUTER QUADRANT OF RIGHT FEMALE BREAST: ICD-10-CM

## 2025-02-27 DIAGNOSIS — Z00.00 ENCOUNTER FOR GENERAL ADULT MEDICAL EXAMINATION W/OUT ABNORMAL FINDINGS: ICD-10-CM

## 2025-02-27 DIAGNOSIS — C50.511 MALIGNANT NEOPLASM OF LOWER-OUTER QUADRANT OF RIGHT FEMALE BREAST: ICD-10-CM

## 2025-02-27 PROCEDURE — G0439: CPT

## 2025-03-06 LAB
25(OH)D3 SERPL-MCNC: 78.2 NG/ML
ALBUMIN SERPL ELPH-MCNC: 4.5 G/DL
ALP BLD-CCNC: 106 U/L
ALT SERPL-CCNC: 11 U/L
ANION GAP SERPL CALC-SCNC: 12 MMOL/L
APPEARANCE: CLEAR
AST SERPL-CCNC: 20 U/L
BASOPHILS # BLD AUTO: 0.05 K/UL
BASOPHILS NFR BLD AUTO: 0.8 %
BILIRUB SERPL-MCNC: 0.3 MG/DL
BILIRUBIN URINE: NEGATIVE
BLOOD URINE: NEGATIVE
BUN SERPL-MCNC: 15 MG/DL
CALCIUM SERPL-MCNC: 10.1 MG/DL
CANCER AG15-3 SERPL-ACNC: 18.2 U/ML
CANCER AG27-29 SERPL-ACNC: 19.4 U/ML
CEA SERPL-MCNC: 3.6 NG/ML
CHLORIDE SERPL-SCNC: 97 MMOL/L
CHOLEST SERPL-MCNC: 197 MG/DL
CO2 SERPL-SCNC: 29 MMOL/L
COLOR: YELLOW
CREAT SERPL-MCNC: 0.76 MG/DL
EGFR: 81 ML/MIN/1.73M2
EOSINOPHIL # BLD AUTO: 0.11 K/UL
EOSINOPHIL NFR BLD AUTO: 1.7 %
ESTIMATED AVERAGE GLUCOSE: 111 MG/DL
GLUCOSE QUALITATIVE U: NEGATIVE MG/DL
GLUCOSE SERPL-MCNC: 86 MG/DL
HBA1C MFR BLD HPLC: 5.5 %
HCT VFR BLD CALC: 39.7 %
HDLC SERPL-MCNC: 89 MG/DL
HGB BLD-MCNC: 13.2 G/DL
IMM GRANULOCYTES NFR BLD AUTO: 0.2 %
KETONES URINE: NEGATIVE MG/DL
LDLC SERPL CALC-MCNC: 89 MG/DL
LEUKOCYTE ESTERASE URINE: NEGATIVE
LYMPHOCYTES # BLD AUTO: 1.74 K/UL
LYMPHOCYTES NFR BLD AUTO: 27.2 %
MAN DIFF?: NORMAL
MCHC RBC-ENTMCNC: 30.5 PG
MCHC RBC-ENTMCNC: 33.2 G/DL
MCV RBC AUTO: 91.7 FL
MONOCYTES # BLD AUTO: 0.53 K/UL
MONOCYTES NFR BLD AUTO: 8.3 %
NEUTROPHILS # BLD AUTO: 3.96 K/UL
NEUTROPHILS NFR BLD AUTO: 61.8 %
NITRITE URINE: NEGATIVE
NONHDLC SERPL-MCNC: 108 MG/DL
PH URINE: 7
PLATELET # BLD AUTO: 201 K/UL
POTASSIUM SERPL-SCNC: 4.8 MMOL/L
PROT SERPL-MCNC: 7.7 G/DL
PROTEIN URINE: NEGATIVE MG/DL
RBC # BLD: 4.33 M/UL
RBC # FLD: 13.1 %
SODIUM SERPL-SCNC: 138 MMOL/L
SPECIFIC GRAVITY URINE: 1.01
TRIGL SERPL-MCNC: 112 MG/DL
TSH SERPL-ACNC: 2.08 UIU/ML
UROBILINOGEN URINE: 0.2 MG/DL
VIT B12 SERPL-MCNC: 681 PG/ML
WBC # FLD AUTO: 6.4 K/UL

## 2025-04-09 ENCOUNTER — OUTPATIENT (OUTPATIENT)
Dept: OUTPATIENT SERVICES | Facility: HOSPITAL | Age: 80
LOS: 1 days | Discharge: HOME | End: 2025-04-09

## 2025-04-09 DIAGNOSIS — C50.511 MALIGNANT NEOPLASM OF LOWER-OUTER QUADRANT OF RIGHT FEMALE BREAST: ICD-10-CM

## 2025-04-09 DIAGNOSIS — Z98.890 OTHER SPECIFIED POSTPROCEDURAL STATES: Chronic | ICD-10-CM

## 2025-04-09 DIAGNOSIS — M12.9 ARTHROPATHY, UNSPECIFIED: Chronic | ICD-10-CM

## 2025-04-09 NOTE — PATIENT PROFILE ADULT. - FUNCTIONAL SCREEN CURRENT LEVEL: TRANSFERRING, MLM
Continue current medication regimen. Will call pt with lab results. Recommend exercise 30 minutes per day most days of the week. Follow up in 6 months for chronic medical problems.     (0) independent

## 2025-04-14 ENCOUNTER — APPOINTMENT (OUTPATIENT)
Dept: HEMATOLOGY ONCOLOGY | Facility: CLINIC | Age: 80
End: 2025-04-14
Payer: MEDICARE

## 2025-04-14 VITALS
OXYGEN SATURATION: 98 % | SYSTOLIC BLOOD PRESSURE: 121 MMHG | BODY MASS INDEX: 30.48 KG/M2 | HEART RATE: 88 BPM | DIASTOLIC BLOOD PRESSURE: 70 MMHG | WEIGHT: 156.09 LBS | RESPIRATION RATE: 15 BRPM | TEMPERATURE: 97.9 F

## 2025-04-14 DIAGNOSIS — C50.511 MALIGNANT NEOPLASM OF LOWER-OUTER QUADRANT OF RIGHT FEMALE BREAST: ICD-10-CM

## 2025-04-14 DIAGNOSIS — Z17.0 MALIGNANT NEOPLASM OF LOWER-OUTER QUADRANT OF RIGHT FEMALE BREAST: ICD-10-CM

## 2025-04-14 PROCEDURE — G2211 COMPLEX E/M VISIT ADD ON: CPT

## 2025-04-14 PROCEDURE — 99214 OFFICE O/P EST MOD 30 MIN: CPT

## 2025-05-13 ENCOUNTER — RESULT REVIEW (OUTPATIENT)
Age: 80
End: 2025-05-13

## 2025-05-13 ENCOUNTER — APPOINTMENT (OUTPATIENT)
Dept: ULTRASOUND IMAGING | Facility: CLINIC | Age: 80
End: 2025-05-13
Payer: MEDICARE

## 2025-05-13 ENCOUNTER — APPOINTMENT (OUTPATIENT)
Dept: MAMMOGRAPHY | Facility: CLINIC | Age: 80
End: 2025-05-13
Payer: MEDICARE

## 2025-05-13 PROCEDURE — 77066 DX MAMMO INCL CAD BI: CPT

## 2025-05-13 PROCEDURE — G0279: CPT

## 2025-05-13 PROCEDURE — 76641 ULTRASOUND BREAST COMPLETE: CPT | Mod: 50

## 2025-05-29 ENCOUNTER — APPOINTMENT (OUTPATIENT)
Dept: SURGICAL ONCOLOGY | Facility: CLINIC | Age: 80
End: 2025-05-29
Payer: MEDICARE

## 2025-05-29 VITALS
DIASTOLIC BLOOD PRESSURE: 77 MMHG | WEIGHT: 155 LBS | RESPIRATION RATE: 16 BRPM | HEIGHT: 60 IN | SYSTOLIC BLOOD PRESSURE: 127 MMHG | BODY MASS INDEX: 30.43 KG/M2 | OXYGEN SATURATION: 98 % | HEART RATE: 73 BPM

## 2025-05-29 VITALS
WEIGHT: 156 LBS | RESPIRATION RATE: 17 BRPM | HEART RATE: 86 BPM | BODY MASS INDEX: 30.63 KG/M2 | HEIGHT: 60 IN | DIASTOLIC BLOOD PRESSURE: 57 MMHG | SYSTOLIC BLOOD PRESSURE: 97 MMHG | OXYGEN SATURATION: 98 %

## 2025-05-29 DIAGNOSIS — C50.919 MALIGNANT NEOPLASM OF UNSPECIFIED SITE OF UNSPECIFIED FEMALE BREAST: ICD-10-CM

## 2025-05-29 PROCEDURE — 99214 OFFICE O/P EST MOD 30 MIN: CPT

## (undated) DEVICE — BLADE SCALPEL SAFETYLOCK #10

## (undated) DEVICE — DRAIN JACKSON PRATT 10MM FLAT FULL NO TROCAR

## (undated) DEVICE — SUCTION YANKAUER NO CONTROL VENT

## (undated) DEVICE — STAPLER SKIN VISI-STAT 35 WIDE

## (undated) DEVICE — MARKING PEN W RULER

## (undated) DEVICE — DRSG TEGADERM 6"X8"

## (undated) DEVICE — DRSG OPSITE 13.75 X 4"

## (undated) DEVICE — SYR ASEPTO

## (undated) DEVICE — SUT POLYSORB 3-0 30" V-20 UNDYED

## (undated) DEVICE — SUT BIOSYN 4-0 18" P-12

## (undated) DEVICE — PACK MAJOR ABDOMINAL WITH LAP

## (undated) DEVICE — SOL IRR POUR NS 0.9% 500ML

## (undated) DEVICE — DRAPE INSTRUMENT POUCH 6.75" X 11"

## (undated) DEVICE — DRSG OPSITE 2.5 X 2"

## (undated) DEVICE — PREP CHLORAPREP HI-LITE ORANGE 26ML

## (undated) DEVICE — VENODYNE/SCD SLEEVE CALF MEDIUM

## (undated) DEVICE — DRAIN RESERVOIR FOR JACKSON PRATT 100CC CARDINAL

## (undated) DEVICE — DRAPE CHEST BREAST 106" X 122"

## (undated) DEVICE — DRSG CURITY GAUZE SPONGE 4 X 4" 12-PLY

## (undated) DEVICE — BLADE SCALPEL SAFETYLOCK #15

## (undated) DEVICE — WARMING BLANKET LOWER ADULT

## (undated) DEVICE — DRAPE LIGHT HANDLE COVER (BLUE)

## (undated) DEVICE — SOL IRR POUR H2O 250ML

## (undated) DEVICE — DRAIN BLAKE 15FR BARD CHANNEL

## (undated) DEVICE — SPECIMEN CONTAINER 100ML

## (undated) DEVICE — GLV 7.5 PROTEXIS (WHITE)

## (undated) DEVICE — DRSG STERISTRIPS 0.5 X 4"

## (undated) DEVICE — DRAPE TOWEL BLUE 17" X 24"

## (undated) DEVICE — FOLEY TRAY 16FR 5CC LTX UMETER CLOSED

## (undated) DEVICE — POSITIONER FOAM EGG CRATE ULNAR 2PCS (PINK)

## (undated) DEVICE — MEDICATION LABELS W MARKER

## (undated) DEVICE — LIGASURE SMALL JAW

## (undated) DEVICE — DRAPE 1/2 SHEET 40X57"

## (undated) DEVICE — DRAPE MAYO STAND 30"

## (undated) DEVICE — SUT SOFSILK 2-0 18" C-23

## (undated) DEVICE — DRAPE 3/4 SHEET 52X76"